# Patient Record
Sex: FEMALE | Race: WHITE | Employment: UNEMPLOYED | URBAN - METROPOLITAN AREA
[De-identification: names, ages, dates, MRNs, and addresses within clinical notes are randomized per-mention and may not be internally consistent; named-entity substitution may affect disease eponyms.]

---

## 2018-11-10 ENCOUNTER — HOSPITAL ENCOUNTER (EMERGENCY)
Age: 33
Discharge: HOME | End: 2018-11-10
Attending: EMERGENCY MEDICINE

## 2018-11-10 VITALS
TEMPERATURE: 98.2 F | RESPIRATION RATE: 16 BRPM | SYSTOLIC BLOOD PRESSURE: 123 MMHG | HEART RATE: 75 BPM | DIASTOLIC BLOOD PRESSURE: 70 MMHG | WEIGHT: 120 LBS | HEIGHT: 63 IN | BODY MASS INDEX: 21.26 KG/M2 | OXYGEN SATURATION: 99 %

## 2018-11-10 DIAGNOSIS — K02.9 PAIN DUE TO DENTAL CARIES: Primary | ICD-10-CM

## 2018-11-10 PROCEDURE — 99282 EMERGENCY DEPT VISIT SF MDM: CPT

## 2018-11-10 RX ORDER — NAPROXEN 500 MG/1
500 TABLET ORAL 2 TIMES DAILY WITH MEALS
Qty: 20 TABLET | Refills: 0 | Status: SHIPPED | OUTPATIENT
Start: 2018-11-10 | End: 2019-05-24 | Stop reason: ALTCHOICE

## 2018-11-10 RX ORDER — PENICILLIN V POTASSIUM 500 MG/1
500 TABLET ORAL 4 TIMES DAILY
Qty: 40 TABLET | Refills: 0 | Status: SHIPPED | OUTPATIENT
Start: 2018-11-10 | End: 2018-11-20

## 2018-11-10 ASSESSMENT — PAIN DESCRIPTION - PAIN TYPE: TYPE: ACUTE PAIN

## 2018-11-10 ASSESSMENT — PAIN SCALES - GENERAL: PAINLEVEL_OUTOF10: 6

## 2018-11-10 ASSESSMENT — PAIN DESCRIPTION - LOCATION: LOCATION: TEETH

## 2019-02-27 ENCOUNTER — HOSPITAL ENCOUNTER (EMERGENCY)
Age: 34
Discharge: HOME | End: 2019-02-27
Attending: EMERGENCY MEDICINE

## 2019-02-27 VITALS
DIASTOLIC BLOOD PRESSURE: 85 MMHG | TEMPERATURE: 97.8 F | BODY MASS INDEX: 21.26 KG/M2 | WEIGHT: 120 LBS | OXYGEN SATURATION: 100 % | RESPIRATION RATE: 18 BRPM | HEART RATE: 85 BPM | SYSTOLIC BLOOD PRESSURE: 121 MMHG

## 2019-02-27 DIAGNOSIS — K04.7 DENTAL INFECTION: ICD-10-CM

## 2019-02-27 DIAGNOSIS — K02.9 DENTAL CARIES: Primary | ICD-10-CM

## 2019-02-27 PROCEDURE — 6370000000 HC RX 637 (ALT 250 FOR IP): Performed by: EMERGENCY MEDICINE

## 2019-02-27 PROCEDURE — 99282 EMERGENCY DEPT VISIT SF MDM: CPT

## 2019-02-27 RX ORDER — DICLOFENAC SODIUM 75 MG/1
75 TABLET, DELAYED RELEASE ORAL 2 TIMES DAILY
Qty: 14 TABLET | Refills: 0 | Status: SHIPPED | OUTPATIENT
Start: 2019-02-27 | End: 2019-05-24 | Stop reason: ALTCHOICE

## 2019-02-27 RX ORDER — PENICILLIN V POTASSIUM 250 MG/1
500 TABLET ORAL ONCE
Status: COMPLETED | OUTPATIENT
Start: 2019-02-27 | End: 2019-02-27

## 2019-02-27 RX ORDER — ACETAMINOPHEN 500 MG
500 TABLET ORAL EVERY 6 HOURS PRN
COMMUNITY
End: 2019-05-24 | Stop reason: ALTCHOICE

## 2019-02-27 RX ORDER — IBUPROFEN 800 MG/1
800 TABLET ORAL EVERY 6 HOURS PRN
COMMUNITY
End: 2019-05-24 | Stop reason: ALTCHOICE

## 2019-02-27 RX ORDER — PENICILLIN V POTASSIUM 500 MG/1
500 TABLET ORAL 4 TIMES DAILY
Qty: 28 TABLET | Refills: 0 | Status: SHIPPED | OUTPATIENT
Start: 2019-02-27 | End: 2019-03-06

## 2019-02-27 RX ADMIN — LIDOCAINE HYDROCHLORIDE 15 ML: 20 SOLUTION ORAL; TOPICAL at 20:19

## 2019-02-27 RX ADMIN — PENICILLIN V POTASIUM 500 MG: 250 TABLET ORAL at 20:19

## 2019-02-27 ASSESSMENT — PAIN DESCRIPTION - PAIN TYPE: TYPE: ACUTE PAIN

## 2019-02-27 ASSESSMENT — PAIN SCALES - GENERAL
PAINLEVEL_OUTOF10: 10
PAINLEVEL_OUTOF10: 9

## 2019-02-27 ASSESSMENT — PAIN - FUNCTIONAL ASSESSMENT: PAIN_FUNCTIONAL_ASSESSMENT: 0-10

## 2019-03-21 ENCOUNTER — HOSPITAL ENCOUNTER (EMERGENCY)
Age: 34
Discharge: HOME | End: 2019-03-21
Payer: MEDICAID

## 2019-03-21 VITALS
WEIGHT: 110 LBS | DIASTOLIC BLOOD PRESSURE: 89 MMHG | OXYGEN SATURATION: 100 % | HEART RATE: 71 BPM | HEIGHT: 62 IN | TEMPERATURE: 98.2 F | RESPIRATION RATE: 14 BRPM | BODY MASS INDEX: 20.24 KG/M2 | SYSTOLIC BLOOD PRESSURE: 117 MMHG

## 2019-03-21 DIAGNOSIS — K04.7 DENTAL INFECTION: Primary | ICD-10-CM

## 2019-03-21 DIAGNOSIS — K08.89 PAIN, DENTAL: ICD-10-CM

## 2019-03-21 PROCEDURE — 99282 EMERGENCY DEPT VISIT SF MDM: CPT

## 2019-03-21 RX ORDER — PENICILLIN V POTASSIUM 250 MG/1
500 TABLET ORAL ONCE
Status: DISCONTINUED | OUTPATIENT
Start: 2019-03-21 | End: 2019-03-21 | Stop reason: HOSPADM

## 2019-03-21 RX ORDER — CHLORHEXIDINE GLUCONATE 0.12 MG/ML
15 RINSE ORAL 2 TIMES DAILY
Qty: 420 ML | Refills: 0 | Status: SHIPPED | OUTPATIENT
Start: 2019-03-21 | End: 2019-04-04

## 2019-03-21 RX ORDER — PENICILLIN V POTASSIUM 500 MG/1
500 TABLET ORAL 4 TIMES DAILY
Qty: 40 TABLET | Refills: 0 | Status: SHIPPED | OUTPATIENT
Start: 2019-03-21 | End: 2019-03-31

## 2019-03-21 ASSESSMENT — PAIN DESCRIPTION - LOCATION: LOCATION: MOUTH

## 2019-03-21 ASSESSMENT — PAIN SCALES - GENERAL: PAINLEVEL_OUTOF10: 7

## 2019-03-21 ASSESSMENT — PAIN DESCRIPTION - DESCRIPTORS: DESCRIPTORS: CONSTANT

## 2019-03-21 ASSESSMENT — PAIN DESCRIPTION - ORIENTATION: ORIENTATION: LEFT

## 2019-03-21 ASSESSMENT — PAIN DESCRIPTION - PAIN TYPE: TYPE: ACUTE PAIN

## 2019-05-24 ENCOUNTER — HOSPITAL ENCOUNTER (EMERGENCY)
Age: 34
Discharge: HOME | End: 2019-05-24
Payer: COMMERCIAL

## 2019-05-24 ENCOUNTER — APPOINTMENT (OUTPATIENT)
Dept: GENERAL RADIOLOGY | Age: 34
End: 2019-05-24
Payer: COMMERCIAL

## 2019-05-24 VITALS
HEIGHT: 62 IN | WEIGHT: 117 LBS | SYSTOLIC BLOOD PRESSURE: 126 MMHG | DIASTOLIC BLOOD PRESSURE: 84 MMHG | TEMPERATURE: 98.1 F | BODY MASS INDEX: 21.53 KG/M2 | HEART RATE: 98 BPM | OXYGEN SATURATION: 97 % | RESPIRATION RATE: 16 BRPM

## 2019-05-24 DIAGNOSIS — M79.672 LEFT FOOT PAIN: ICD-10-CM

## 2019-05-24 DIAGNOSIS — B07.0 PLANTAR WART OF LEFT FOOT: Primary | ICD-10-CM

## 2019-05-24 PROCEDURE — 4500000023 HC ED LEVEL 3 PROCEDURE

## 2019-05-24 PROCEDURE — 99283 EMERGENCY DEPT VISIT LOW MDM: CPT

## 2019-05-24 PROCEDURE — 73630 X-RAY EXAM OF FOOT: CPT

## 2019-05-24 ASSESSMENT — PAIN DESCRIPTION - LOCATION: LOCATION: FOOT

## 2019-05-24 ASSESSMENT — PAIN DESCRIPTION - ORIENTATION: ORIENTATION: LEFT

## 2019-05-24 ASSESSMENT — PAIN DESCRIPTION - PAIN TYPE: TYPE: CHRONIC PAIN

## 2019-05-24 ASSESSMENT — PAIN SCALES - GENERAL: PAINLEVEL_OUTOF10: 5

## 2019-05-24 NOTE — ED PROVIDER NOTES
patient's chronic foot pain. Consider further evaluation with nonemergent MRI. PROCEDURE:  Debridement of callus and plantar wart  Orelia Dakins or their surrogate had an opportunity to ask questions, and the risks, benefits, and alternatives were discussed. I prepped the bottom of her foot with alcohol swabs. Using a 15 blade, I shaved down the calloused area and cored out the wart on the bottom of the patient's left foot. The area was then cleansed again. The patient remains neurovascularly intact. There were no complications during the procedure, and the patient tolerated it well. ED COURSE/MDM:  Patient was given the following medications: none      I have evaluated this patient here in the ED. She is here reporting left foot pain that is localized to the plantar surface of her left foot where she has a plantar wart. I debrided and shaved off the dead, calloused skin using a 15 blade. The patient reports significant improvement in pain when she stands/weightbears. I recommended over-the-counter wart medication and will also provide her with a family physician referral and podiatry referral.    I estimate there is LOW risk for RETAINED FB. ABSCESS. CELLULITIS, ACUTE FRACTURE, SEPTIC ARTHRITIS, TENDON OR NEUROVASCULAR INJURY, thus I consider the discharge disposition reasonable. Orelia Dakins and I have discussed the diagnosis and risks, and we agree with discharging home to follow-up with their primary doctor or the referral orthopedist. We also discussed returning to the Emergency Department immediately if new or worsening symptoms occur. We have discussed the symptoms which are most concerning (e.g., changing or worsening pain, numbness, weakness) that necessitate immediate return. CLINICAL IMPRESSION:  1. Plantar wart of left foot    2. Left foot pain        Blood pressure 126/84, pulse 98, temperature 98.1 °F (36.7 °C), temperature source Oral, resp.  rate 16, height 5' 2\" (1.575 m), weight 117 lb (53.1 kg), SpO2 97 %, not currently breastfeeding. PATIENT REFERRED TO:  [facility]  [address], [address] [address]    Schedule an appointment as soon as possible for a visit       Elsi Marcano 93  Patient was discharged to home in good condition.           Terry Pozoma  05/24/19 8711

## 2019-05-24 NOTE — ED NOTES
Pt instructed to follow up with Podiatrist. Assessed per Ramesh MAYBERRY.      Jannie Nieto, FAITH  52/18/73 3414

## 2021-01-07 ENCOUNTER — HOSPITAL ENCOUNTER (EMERGENCY)
Age: 36
Discharge: HOME | End: 2021-01-07
Attending: EMERGENCY MEDICINE
Payer: COMMERCIAL

## 2021-01-07 VITALS
OXYGEN SATURATION: 100 % | BODY MASS INDEX: 25.21 KG/M2 | RESPIRATION RATE: 15 BRPM | SYSTOLIC BLOOD PRESSURE: 138 MMHG | DIASTOLIC BLOOD PRESSURE: 80 MMHG | HEIGHT: 62 IN | HEART RATE: 70 BPM | TEMPERATURE: 98 F | WEIGHT: 137 LBS

## 2021-01-07 DIAGNOSIS — L03.211 FACIAL CELLULITIS: ICD-10-CM

## 2021-01-07 DIAGNOSIS — K04.7 DENTAL INFECTION: Primary | ICD-10-CM

## 2021-01-07 PROCEDURE — 6370000000 HC RX 637 (ALT 250 FOR IP): Performed by: EMERGENCY MEDICINE

## 2021-01-07 PROCEDURE — 6360000002 HC RX W HCPCS: Performed by: EMERGENCY MEDICINE

## 2021-01-07 PROCEDURE — 99283 EMERGENCY DEPT VISIT LOW MDM: CPT

## 2021-01-07 PROCEDURE — 96372 THER/PROPH/DIAG INJ SC/IM: CPT

## 2021-01-07 RX ORDER — KETOROLAC TROMETHAMINE 30 MG/ML
15 INJECTION, SOLUTION INTRAMUSCULAR; INTRAVENOUS ONCE
Status: COMPLETED | OUTPATIENT
Start: 2021-01-07 | End: 2021-01-07

## 2021-01-07 RX ORDER — CLINDAMYCIN HYDROCHLORIDE 150 MG/1
150 CAPSULE ORAL ONCE
Status: COMPLETED | OUTPATIENT
Start: 2021-01-07 | End: 2021-01-07

## 2021-01-07 RX ORDER — IBUPROFEN 400 MG/1
400 TABLET ORAL EVERY 6 HOURS PRN
Qty: 20 TABLET | Refills: 0 | Status: SHIPPED | OUTPATIENT
Start: 2021-01-07

## 2021-01-07 RX ORDER — FLUOXETINE HYDROCHLORIDE 20 MG/1
20 CAPSULE ORAL DAILY
COMMUNITY

## 2021-01-07 RX ORDER — CHLORHEXIDINE GLUCONATE 0.12 MG/ML
15 RINSE ORAL 2 TIMES DAILY
Qty: 420 ML | Refills: 0 | Status: SHIPPED | OUTPATIENT
Start: 2021-01-07 | End: 2021-01-21

## 2021-01-07 RX ORDER — CLINDAMYCIN HYDROCHLORIDE 300 MG/1
300 CAPSULE ORAL 2 TIMES DAILY
Qty: 14 CAPSULE | Refills: 0 | Status: SHIPPED | OUTPATIENT
Start: 2021-01-07 | End: 2021-01-14

## 2021-01-07 RX ADMIN — CLINDAMYCIN HYDROCHLORIDE 150 MG: 150 CAPSULE ORAL at 06:17

## 2021-01-07 RX ADMIN — KETOROLAC TROMETHAMINE 15 MG: 30 INJECTION, SOLUTION INTRAMUSCULAR at 06:17

## 2021-01-07 ASSESSMENT — ENCOUNTER SYMPTOMS
SHORTNESS OF BREATH: 0
ABDOMINAL PAIN: 0
FACIAL SWELLING: 1
VOMITING: 0
TROUBLE SWALLOWING: 0
PHOTOPHOBIA: 0
COUGH: 0
COLOR CHANGE: 1

## 2021-01-07 ASSESSMENT — PAIN SCALES - GENERAL: PAINLEVEL_OUTOF10: 8

## 2021-01-07 NOTE — ED PROVIDER NOTES
[facility]  ED  EMERGENCY DEPARTMENTENCOUNTER      Pt Name: Gilmer Brown  MRN: [id number]  [place] 1985  Date ofevaluation: 1/7/2021  Provider: Deepika Sauceda MD    CHIEF COMPLAINT       Chief Complaint   Patient presents with    Dental Pain     started with toothache yesterday; now having swelling on the left side of her face         HISTORY OF PRESENT ILLNESS   (Location/Symptom, Timing/Onset,Context/Setting, Quality, Duration, Modifying Factors, Severity)  Note limiting factors. Gilmer Brown is a 38 y.o. female  who  has a past medical history of Anxiety, Bipolar 1 disorder ([address] Depression, DVT (deep vein thrombosis) in pregnancy ([facility] [address] Herpes, and STD (sexually transmitted disease). who presents to the emergency department for evaluation of dental pain and facial swelling. Patient reports to developing dental pain the previous day. She states when she woke up this morning she noticed swelling to the left side of her face under her eye. She denies fevers nausea vomiting cultures breathing or swallowing. She states does have a history of dental infections. She denies pain with eye movements or changes in vision. She has not taken medications for her symptoms. She does not currently see a dentist.     HPI    NursingNotes were reviewed. REVIEW OF SYSTEMS    (2-9 systems for level 4, 10 or more for level 5)     Review of Systems   Constitutional: Negative for activity change, fatigue and fever. HENT: Positive for dental problem and facial swelling. Negative for congestion, mouth sores and trouble swallowing. Eyes: Negative for photophobia and visual disturbance. Respiratory: Negative for cough and shortness of breath. Cardiovascular: Negative for chest pain and palpitations. Gastrointestinal: Negative for abdominal pain and vomiting. Genitourinary: Negative for difficulty urinating and frequency. Musculoskeletal: Negative for gait problem and neck pain. Skin: Positive for color change. Negative for rash. Neurological: Negative for dizziness, light-headedness and headaches. Psychiatric/Behavioral: Negative for confusion. The patient is not nervous/anxious. All other systems reviewed and are negative. Except as noted above the remainder of the review of systems was reviewed and negative.        PAST MEDICAL HISTORY     Past Medical History:   Diagnosis Date    Anxiety     no meds at present    [facility] 1 disorder ([facility] [address]     Depression     [facility]     Herpes     genital    STD (sexually transmitted disease)     herpes         SURGICALHISTORY       Past Surgical History:   Procedure Laterality Date    APPENDECTOMY       SECTION      COLPOSCOPY      2013    DILATION AND CURETTAGE OF UTERUS      2006    TUBAL LIGATION           CURRENT MEDICATIONS       Previous Medications    FLUOXETINE (PROZAC) 20 MG CAPSULE    Take 20 mg by mouth daily            Morphine    FAMILY HISTORY       Family History   Problem Relation Age of Onset    Heart Attack Father     Heart Failure Maternal Grandmother     Cancer Maternal Grandfather         lung    Heart Attack Paternal Grandmother     Cancer Paternal Grandfather         lung          SOCIAL HISTORY       Social History     Socioeconomic History    Marital status: Single     Spouse name: Not on file    Number of children: 6    Years of education: 8    Highest education level: Not on file   Occupational History    Occupation: homemaker   Social Needs    Financial resource strain: Not on file    Food insecurity     Worry: Not on file     Inability: Not on file   [employer] needs     Medical: Not on file     Non-medical: Not on file   Tobacco Use    Smoking status: Current Every Day Smoker     Packs/day: 1.00     Years: 16.00     Pack years: 16.00     Types: Cigarettes    Smokeless tobacco: Never Used   Substance and Sexual Activity  Alcohol use: No    Drug use: No    Sexual activity: Yes     Partners: Male   Lifestyle    Physical activity     Days per week: Not on file     Minutes per session: Not on file    Stress: Not on file   Relationships    Social connections     Talks on phone: Not on file     Gets together: Not on file     Attends Muslim service: Not on file     Active member of club or organization: Not on file     Attends meetings of clubs or organizations: Not on file     Relationship status: Not on file    Intimate partner violence     Fear of current or ex partner: Not on file     Emotionally abused: Not on file     Physically abused: Not on file     Forced sexual activity: Not on file   Other Topics Concern    Not on file   Social History Narrative    Second oldest child lives with his father and she sees him every weekend. SCREENINGS             PHYSICAL EXAM    (up to 7 for level 4, 8 or more for level 5)     ED Triage Vitals [01/07/21 0531]   BP Temp Temp src Pulse Resp SpO2 Height Weight   138/80 98 °F (36.7 °C) -- 70 15 100 % 5' 2\" (1.575 m) 137 lb (62.1 kg)       Physical Exam  Vitals signs and nursing note reviewed. Constitutional:       Appearance: She is well-developed. HENT:      Head: Normocephalic and atraumatic. Comments: Left-sided suborbital swelling and tenderness. Mouth/Throat:      Mouth: No oral lesions. Dentition: Abnormal dentition. Dental tenderness and dental caries present. No dental abscesses or gum lesions. Tongue: No lesions. Pharynx: Oropharynx is clear. Uvula midline. No pharyngeal swelling or oropharyngeal exudate. Eyes:      Conjunctiva/sclera: Conjunctivae normal.      Pupils: Pupils are equal, round, and reactive to light. Neck:      Musculoskeletal: Normal range of motion. Trachea: No tracheal deviation. Cardiovascular:      Rate and Rhythm: Normal rate and regular rhythm. Heart sounds: Normal heart sounds.    Pulmonary: -  Work-up included:  See above  -  ED treatment included: See above  -  Results discussed with patient. Patient presents the ED for evaluation of dental pain and facial swelling. She has very poor dentition with multiple eroding teeth. No obvious signs of abscess on inspection of her gums. There is soft tissue swelling but bedside ultrasound was used to evaluate the patient's cheek which did not show any discrete fluid collections. Vital signs are within normal limits and patient nontoxic-appearing. She is managing her secretions oropharynx is clear. Patient feels improved on reevaluation. Symptomatic treatment with expectant management discussed with the patient and the amenable to treatment plan and outpatient follow-up. Strict return precautions were discussed with the patient. They demonstrated understanding of when to return to the emergency department for new or worsening symptoms. .  The patient is agreeable with plan of care and disposition. REASSESSMENT          CRITICAL CARE TIME   Total Critical Care time was 0 minutes, excluding separatelyreportable procedures. There was a high probability ofclinically significant/life threatening deterioration in the patient's condition which required my urgent intervention. CONSULTS:  None    PROCEDURES:  Unless otherwise noted below, none     Procedures    FINAL IMPRESSION      1. Dental infection    2.  Facial cellulitis          DISPOSITION/PLAN   DISPOSITION Discharge - Pending Orders Complete 01/07/2021 05:43:37 AM      PATIENT REFERREDTO:  [facility]) Pre-Services  [phone]          DISCHARGEMEDICATIONS:  New Prescriptions    CHLORHEXIDINE (PERIDEX) 0.12 % SOLUTION    Take 15 mLs by mouth 2 times daily for 14 days    CLINDAMYCIN (CLEOCIN) 300 MG CAPSULE    Take 1 capsule by mouth 2 times daily for 7 days    IBUPROFEN (ADVIL;MOTRIN) 400 MG TABLET    Take 1 tablet by mouth every 6 hours as needed for Pain (Please note that portions of this note were completed with a voice recognition program.  Efforts were made to edit the dictations but occasionally words are mis-transcribed.)    Omid Boswell MD (electronically signed)  Attending Emergency Physician          Omid Boswell MD  01/07/21 [id number]

## 2021-02-07 ENCOUNTER — HOSPITAL ENCOUNTER (EMERGENCY)
Age: 36
Discharge: HOME | End: 2021-02-07
Attending: EMERGENCY MEDICINE
Payer: COMMERCIAL

## 2021-02-07 VITALS
WEIGHT: 137 LBS | HEIGHT: 62 IN | SYSTOLIC BLOOD PRESSURE: 115 MMHG | OXYGEN SATURATION: 99 % | BODY MASS INDEX: 25.21 KG/M2 | RESPIRATION RATE: 18 BRPM | HEART RATE: 109 BPM | DIASTOLIC BLOOD PRESSURE: 82 MMHG | TEMPERATURE: 98.8 F

## 2021-02-07 DIAGNOSIS — K04.7 DENTAL ABSCESS: Primary | ICD-10-CM

## 2021-02-07 PROCEDURE — 99283 EMERGENCY DEPT VISIT LOW MDM: CPT

## 2021-02-07 PROCEDURE — 6370000000 HC RX 637 (ALT 250 FOR IP): Performed by: EMERGENCY MEDICINE

## 2021-02-07 RX ORDER — ACETAMINOPHEN 325 MG/1
650 TABLET ORAL ONCE
Status: COMPLETED | OUTPATIENT
Start: 2021-02-07 | End: 2021-02-07

## 2021-02-07 RX ORDER — PENICILLIN V POTASSIUM 250 MG/1
500 TABLET ORAL ONCE
Status: COMPLETED | OUTPATIENT
Start: 2021-02-07 | End: 2021-02-07

## 2021-02-07 RX ORDER — PENICILLIN V POTASSIUM 500 MG/1
500 TABLET ORAL 4 TIMES DAILY
Qty: 40 TABLET | Refills: 0 | Status: SHIPPED | OUTPATIENT
Start: 2021-02-07 | End: 2021-02-17

## 2021-02-07 RX ORDER — IBUPROFEN 600 MG/1
600 TABLET ORAL ONCE
Status: COMPLETED | OUTPATIENT
Start: 2021-02-07 | End: 2021-02-07

## 2021-02-07 RX ADMIN — IBUPROFEN 600 MG: 600 TABLET ORAL at 13:48

## 2021-02-07 RX ADMIN — ACETAMINOPHEN 650 MG: 325 TABLET ORAL at 13:48

## 2021-02-07 RX ADMIN — PENICILLIN V POTASSIUM 500 MG: 250 TABLET, FILM COATED ORAL at 13:48

## 2021-02-07 NOTE — ED PROVIDER NOTES
for level 5)     ED Triage Vitals   BP Temp Temp Source Pulse Resp SpO2 Height Weight   02/07/21 1315 02/07/21 1315 02/07/21 1315 02/07/21 1315 02/07/21 1313 02/07/21 1315 02/07/21 1313 02/07/21 1313   115/82 98.8 °F (37.1 °C) Oral 109 18 99 % 5' 2\" (1.575 m) 137 lb (62.1 kg)       Physical Exam      General appearance:  Cooperative. No acute distress. Skin:  Warm. Dry. Ears, nose, mouth and throat:  Oral mucosa moist, mild soft tissue swelling to the right upper face just inferior and lateral to the maxillary sinus. No trismus. Patient has markedly poor dentition with the majority of her teeth decayed down to the gumline. There is soft tissue swelling edema and erythema to the right upper gumline adjacent to tooth #3 and 4 with purulent discharge present at the site. Tongue and uvular midline. Floor of mouth soft. Perfusion:  intact  Respiratory: Respirations nonlabored. Neurological:  Alert. Moves all extremities spontaneously  Musculoskeletal:   Normal ROM, no deformities          Psychiatric:  Normal mood      DIAGNOSTIC RESULTS       Labs Reviewed - No data to display    Interpretation per the Radiologist below, if obtained/available at the time of this note:    No orders to display       All other labs/imaging were within normal range or not returned as of this dictation. EMERGENCY DEPARTMENT COURSE and DIFFERENTIAL DIAGNOSIS/MDM:   Vitals:    Vitals:    02/07/21 1313 02/07/21 1315   BP:  115/82   Pulse:  109   Resp: 18 18   Temp:  98.8 °F (37.1 °C)   TempSrc:  Oral   SpO2:  99%   Weight: 137 lb (62.1 kg)    Height: 5' 2\" (1.575 m)        Patient with an obvious dental abscess that is spontaneously draining at this time. Otherwise hemodynamically stable. Tolerating oral intake. Did not require incision and drainage given her spontaneous drainage.   Given penicillin here will be discharged home with the same as well as instructions to follow-up with a dentist of her

## 2022-01-12 ENCOUNTER — HOSPITAL ENCOUNTER (EMERGENCY)
Age: 37
Discharge: HOME | End: 2022-01-12
Attending: EMERGENCY MEDICINE
Payer: COMMERCIAL

## 2022-01-12 VITALS
HEART RATE: 94 BPM | DIASTOLIC BLOOD PRESSURE: 79 MMHG | SYSTOLIC BLOOD PRESSURE: 130 MMHG | BODY MASS INDEX: 25.21 KG/M2 | OXYGEN SATURATION: 99 % | WEIGHT: 137 LBS | RESPIRATION RATE: 16 BRPM | TEMPERATURE: 98.6 F | HEIGHT: 62 IN

## 2022-01-12 DIAGNOSIS — K04.7 DENTAL INFECTION: Primary | ICD-10-CM

## 2022-01-12 PROCEDURE — 6370000000 HC RX 637 (ALT 250 FOR IP): Performed by: EMERGENCY MEDICINE

## 2022-01-12 PROCEDURE — 99283 EMERGENCY DEPT VISIT LOW MDM: CPT

## 2022-01-12 RX ORDER — IBUPROFEN 600 MG/1
600 TABLET ORAL ONCE
Status: COMPLETED | OUTPATIENT
Start: 2022-01-12 | End: 2022-01-12

## 2022-01-12 RX ORDER — HYDROCODONE BITARTRATE AND ACETAMINOPHEN 5; 325 MG/1; MG/1
1 TABLET ORAL ONCE
Status: COMPLETED | OUTPATIENT
Start: 2022-01-12 | End: 2022-01-12

## 2022-01-12 RX ORDER — PENICILLIN V POTASSIUM 500 MG/1
500 TABLET ORAL 4 TIMES DAILY
Qty: 40 TABLET | Refills: 0 | Status: SHIPPED | OUTPATIENT
Start: 2022-01-12 | End: 2022-01-22

## 2022-01-12 RX ORDER — IBUPROFEN 600 MG/1
600 TABLET ORAL 3 TIMES DAILY PRN
Qty: 30 TABLET | Refills: 0 | Status: SHIPPED | OUTPATIENT
Start: 2022-01-12

## 2022-01-12 RX ORDER — PENICILLIN V POTASSIUM 250 MG/1
500 TABLET ORAL ONCE
Status: COMPLETED | OUTPATIENT
Start: 2022-01-12 | End: 2022-01-12

## 2022-01-12 RX ADMIN — HYDROCODONE BITARTRATE AND ACETAMINOPHEN 1 TABLET: 5; 325 TABLET ORAL at 06:33

## 2022-01-12 RX ADMIN — PENICILLIN V POTASSIUM 500 MG: 250 TABLET, FILM COATED ORAL at 06:33

## 2022-01-12 RX ADMIN — IBUPROFEN 600 MG: 600 TABLET ORAL at 06:33

## 2022-01-12 ASSESSMENT — PAIN DESCRIPTION - LOCATION: LOCATION: TEETH

## 2022-01-12 ASSESSMENT — PAIN SCALES - GENERAL: PAINLEVEL_OUTOF10: 5

## 2022-01-12 ASSESSMENT — PAIN DESCRIPTION - PAIN TYPE: TYPE: ACUTE PAIN

## 2022-01-12 NOTE — ED PROVIDER NOTES
[facility]  ED  EMERGENCY DEPARTMENT ENCOUNTER      Pt Name: Pratik Virk  MRN: [id number]  [place] 1985  Date of evaluation: 2022  Provider: Jessica Gomez MD    CHIEF COMPLAINT       Chief Complaint   Patient presents with    Dental Pain     +facial swelling, front tooth woke up with pain at 4am         HISTORY OF PRESENT ILLNESS   (Location/Symptom, Timing/Onset, Context/Setting, Quality, Duration, Modifying Factors, Severity)  Note limiting factors. Pratik Virk is a 43 y.o. female with past medical history of no related illness here today for dental pain. Patient states she has a longstanding history of poor dentition. She notes over the course of the last 24 hours she has had increased throbbing aching moderate to severe pain in the left front of her upper jaw with associated soft tissue swelling. No fevers or chills. Pain is worse with \"air touching my teeth\". She states she has been unable to eat. She has not been able to see a dentist though knows she needs significant dental work. No obvious alleviating factors. No fevers. No trouble swallowing or speaking. HPI    Nursing Notes were reviewed. REVIEW OF SYSTEMS    (2-9 systems for level 4, 10 or more for level 5)     Review of Systems    Please see HPI for pertinent positive and negative review of system findings. A full 10 system ROS was performed and otherwise negative.         PAST MEDICAL HISTORY     Past Medical History:   Diagnosis Date    Anxiety     no meds at present    Bipolar 1 disorder ([facility] [address]     Depression     DVT (deep vein thrombosis) in pregnancy     Herpes 2013    genital    STD (sexually transmitted disease) 2013    herpes         SURGICAL HISTORY       Past Surgical History:   Procedure Laterality Date    APPENDECTOMY       SECTION      COLPOSCOPY      2013    DILATION AND CURETTAGE OF UTERUS      2006    TUBAL LIGATION           CURRENT MEDICATIONS Previous Medications    FLUOXETINE (PROZAC) 20 MG CAPSULE    Take 20 mg by mouth daily    IBUPROFEN (ADVIL;MOTRIN) 400 MG TABLET    Take 1 tablet by mouth every 6 hours as needed for Pain       ALLERGIES     Morphine    FAMILY HISTORY       Family History   Problem Relation Age of Onset    Heart Attack Father     Heart Failure Maternal Grandmother     Cancer Maternal Grandfather         lung    Heart Attack Paternal Grandmother     Cancer Paternal Grandfather         lung          SOCIAL HISTORY       Social History     Socioeconomic History    Marital status: Single     Spouse name: None    Number of children: 10    Years of education: 8    Highest education level: None   Occupational History    Occupation: homemaker   Tobacco Use    Smoking status: Current Every Day Smoker     Packs/day: 1.00     Years: 16.00     Pack years: 16.00     Types: Cigarettes    Smokeless tobacco: Never Used   Substance and Sexual Activity    Alcohol use: No    Drug use: No    Sexual activity: Yes     Partners: Male   Other Topics Concern    None   Social History Narrative    Second oldest child lives with his father and she sees him every weekend. Social Determinants of Health     Financial Resource Strain:     Difficulty of Paying Living Expenses: Not on file   Food Insecurity:     Worried About Running Out of Food in the Last Year: Not on file    Humera of Food in the Last Year: Not on file   Transportation Needs:     Lack of Transportation (Medical): Not on file    Lack of Transportation (Non-Medical):  Not on file   Physical Activity:     Days of Exercise per Week: Not on file    Minutes of Exercise per Session: Not on file   Stress:     Feeling of Stress : Not on file   Social Connections:     Frequency of Communication with Friends and Family: Not on file    Frequency of Social Gatherings with Friends and Family: Not on file    Attends Holiness Services: Not on file   [employer] of Clubs or Organizations: Not on file    Attends Club or Organization Meetings: Not on file    Marital Status: Not on file   Intimate Partner Violence:     Fear of Current or Ex-Partner: Not on file    Emotionally Abused: Not on file    Physically Abused: Not on file    Sexually Abused: Not on file   Housing Stability:     Unable to Pay for Housing in the Last Year: Not on file    Number of [place] in the Last Year: Not on file    Unstable Housing in the Last Year: Not on file       SCREENINGS               PHYSICAL EXAM    (up to 7 for level 4, 8 or more for level 5)     ED Triage Vitals [01/12/22 0534]   BP Temp Temp Source Pulse Resp SpO2 Height Weight   130/79 98.6 °F (37 °C) Temporal 94 14 99 % 5' 2\" (1.575 m) 137 lb (62.1 kg)       Physical Exam      General appearance:  Cooperative. No acute distress. Skin:  Warm. Dry. Ears, nose, mouth and throat:  Oral mucosa moist, mild soft tissue swelling and fullness in the left nasolabial fold without overlying erythema. Tongue and uvular midline. Floor of mouth soft. Very poor dentition with heavily diseased teeth throughout. Mild fullness in the upper gums of the left anterior jaw particularly above tooth #10 and 11. No gross fluctuance. No discharge or drainage. Floor mouth soft. No trismus. Perfusion:  intact  Respiratory: Respirations nonlabored. Neurological:  Alert. Moves all extremities spontaneously  Musculoskeletal:   Normal ROM, no deformities          Psychiatric:  Normal mood      DIAGNOSTIC RESULTS       Labs Reviewed - No data to display    Interpretation per the Radiologist below, if obtained/available at the time of this note:    No orders to display       All other labs/imaging were within normal range or not returned as of this dictation.     EMERGENCY DEPARTMENT COURSE and DIFFERENTIAL DIAGNOSIS/MDM:   Vitals:    Vitals:    01/12/22 0534   BP: 130/79   Pulse: 94   Resp: 14   Temp: 98.6 °F (37 °C)   TempSrc: Temporal   SpO2: 99% Weight: 137 lb (62.1 kg)   Height: 5' 2\" (1.575 m)       Patient presents with a dental infection. She does have some mild soft tissue facial swelling and swelling in the left upper jaw but no gross fluctuance or drainable mass. Patient was given pain medication, 1 dose of narcotics here and was started on penicillin. Will be discharged home with penicillin and Motrin and given outpatient dental follow-up. Encouraged to return for worsening    MDM    CONSULTS     None    Critical Care:   None    REASSESSMENT          PROCEDURE     Unless otherwise noted below, none     Procedures      FINAL IMPRESSION      1. Dental infection            DISPOSITION/PLAN   DISPOSITION Decision To Discharge 01/12/2022 06:30:15 AM        PATIENT REFERRED TO:  Dentistry    Schedule an appointment as soon as possible for a visit   Please follow up with a dentist of your [address] [address]  [phone]  Schedule an appointment as soon as possible for a visit today        DISCHARGE MEDICATIONS:  New Prescriptions    IBUPROFEN (ADVIL;MOTRIN) 600 MG TABLET    Take 1 tablet by mouth 3 times daily as needed for Pain    PENICILLIN V POTASSIUM (VEETID) 500 MG TABLET    Take 1 tablet by mouth 4 times daily for 10 days     Controlled Substances Monitoring:     No flowsheet data found.     (Please note that portions of this note were completed with a voice recognition program.  Efforts were made to edit the dictations but occasionally words are mis-transcribed.)    Mike Dent MD (electronically signed)  Attending Emergency Physician            Marty Jimenez MD  01/12/22 5546

## 2023-12-09 ENCOUNTER — HOSPITAL ENCOUNTER (EMERGENCY)
Age: 38
Discharge: HOME | End: 2023-12-09

## 2023-12-09 VITALS
HEIGHT: 62 IN | HEART RATE: 81 BPM | TEMPERATURE: 98.3 F | WEIGHT: 110 LBS | BODY MASS INDEX: 20.24 KG/M2 | SYSTOLIC BLOOD PRESSURE: 106 MMHG | OXYGEN SATURATION: 100 % | DIASTOLIC BLOOD PRESSURE: 72 MMHG | RESPIRATION RATE: 16 BRPM

## 2023-12-09 DIAGNOSIS — J01.00 ACUTE NON-RECURRENT MAXILLARY SINUSITIS: Primary | ICD-10-CM

## 2023-12-09 PROCEDURE — 99283 EMERGENCY DEPT VISIT LOW MDM: CPT

## 2023-12-09 RX ORDER — AMOXICILLIN AND CLAVULANATE POTASSIUM 875; 125 MG/1; MG/1
1 TABLET, FILM COATED ORAL 2 TIMES DAILY
Qty: 20 TABLET | Refills: 0 | Status: SHIPPED | OUTPATIENT
Start: 2023-12-09 | End: 2023-12-19

## 2023-12-09 ASSESSMENT — PAIN - FUNCTIONAL ASSESSMENT: PAIN_FUNCTIONAL_ASSESSMENT: 0-10

## 2023-12-09 ASSESSMENT — PAIN SCALES - GENERAL: PAINLEVEL_OUTOF10: 5

## 2023-12-09 NOTE — DISCHARGE INSTRUCTIONS
-Take 400mg Ibuprofen (Motrin) and 500mg Acetaminophen (Tylenol) every 4 hours for pain.   -Antibiotics for your sinus infection.   -Follow up with ENT next week. -Come back if you feel worse.

## 2024-03-11 ENCOUNTER — APPOINTMENT (OUTPATIENT)
Dept: CT IMAGING | Age: 39
DRG: 271 | End: 2024-03-11
Attending: EMERGENCY MEDICINE
Payer: MEDICAID

## 2024-03-11 ENCOUNTER — APPOINTMENT (OUTPATIENT)
Dept: VASCULAR LAB | Age: 39
DRG: 271 | End: 2024-03-11
Payer: MEDICAID

## 2024-03-11 ENCOUNTER — HOSPITAL ENCOUNTER (INPATIENT)
Age: 39
LOS: 2 days | Discharge: HOME | DRG: 271 | End: 2024-03-13
Attending: EMERGENCY MEDICINE | Admitting: HOSPITALIST
Payer: MEDICAID

## 2024-03-11 DIAGNOSIS — I82.401 ACUTE DEEP VEIN THROMBOSIS (DVT) OF RIGHT LOWER EXTREMITY, UNSPECIFIED VEIN (HCC): ICD-10-CM

## 2024-03-11 DIAGNOSIS — I82.411 ACUTE DEEP VEIN THROMBOSIS (DVT) OF FEMORAL VEIN OF RIGHT LOWER EXTREMITY (HCC): ICD-10-CM

## 2024-03-11 DIAGNOSIS — D68.51 FACTOR V LEIDEN (HCC): ICD-10-CM

## 2024-03-11 DIAGNOSIS — I82.421 ACUTE DEEP VEIN THROMBOSIS (DVT) OF ILIAC VEIN OF RIGHT LOWER EXTREMITY (HCC): Primary | ICD-10-CM

## 2024-03-11 LAB
ALBUMIN SERPL-MCNC: 4.1 G/DL (ref 3.4–5)
ALBUMIN/GLOB SERPL: 1.2 {RATIO} (ref 1.1–2.2)
ALP SERPL-CCNC: 211 U/L (ref 40–129)
ALT SERPL-CCNC: 40 U/L (ref 10–40)
ANION GAP SERPL CALCULATED.3IONS-SCNC: 13 MMOL/L (ref 3–16)
ANTI-XA UNFRAC HEPARIN: 0.12 IU/ML (ref 0.3–0.7)
ANTI-XA UNFRAC HEPARIN: <0.1 IU/ML (ref 0.3–0.7)
APTT BLD: 32.8 SEC (ref 22.7–35.9)
AST SERPL-CCNC: 32 U/L (ref 15–37)
BACTERIA URNS QL MICRO: ABNORMAL /HPF
BASOPHILS # BLD: 0.1 K/UL (ref 0–0.2)
BASOPHILS NFR BLD: 0.5 %
BILIRUB SERPL-MCNC: 0.7 MG/DL (ref 0–1)
BILIRUB UR QL STRIP.AUTO: ABNORMAL
BUN SERPL-MCNC: 14 MG/DL (ref 7–20)
CALCIUM SERPL-MCNC: 9.6 MG/DL (ref 8.3–10.6)
CHLORIDE SERPL-SCNC: 97 MMOL/L (ref 99–110)
CLARITY UR: ABNORMAL
CO2 SERPL-SCNC: 24 MMOL/L (ref 21–32)
COLOR UR: YELLOW
CREAT SERPL-MCNC: 0.7 MG/DL (ref 0.6–1.1)
DEPRECATED RDW RBC AUTO: 13.7 % (ref 12.4–15.4)
EKG ATRIAL RATE: 83 BPM
EKG DIAGNOSIS: NORMAL
EKG P AXIS: 80 DEGREES
EKG P-R INTERVAL: 150 MS
EKG Q-T INTERVAL: 348 MS
EKG QRS DURATION: 90 MS
EKG QTC CALCULATION (BAZETT): 408 MS
EKG R AXIS: 74 DEGREES
EKG T AXIS: 12 DEGREES
EKG VENTRICULAR RATE: 83 BPM
EOSINOPHIL # BLD: 0.3 K/UL (ref 0–0.6)
EOSINOPHIL NFR BLD: 2.2 %
EPI CELLS #/AREA URNS HPF: ABNORMAL /HPF (ref 0–5)
GFR SERPLBLD CREATININE-BSD FMLA CKD-EPI: >60 ML/MIN/{1.73_M2}
GLUCOSE SERPL-MCNC: 105 MG/DL (ref 70–99)
GLUCOSE UR STRIP.AUTO-MCNC: NEGATIVE MG/DL
HCG SERPL QL: NEGATIVE
HCT VFR BLD AUTO: 38.9 % (ref 36–48)
HGB BLD-MCNC: 13 G/DL (ref 12–16)
HGB UR QL STRIP.AUTO: ABNORMAL
INR PPP: 1.1 (ref 0.84–1.16)
KETONES UR STRIP.AUTO-MCNC: 15 MG/DL
LEUKOCYTE ESTERASE UR QL STRIP.AUTO: ABNORMAL
LIPASE SERPL-CCNC: 25 U/L (ref 13–60)
LYMPHOCYTES # BLD: 2 K/UL (ref 1–5.1)
LYMPHOCYTES NFR BLD: 15.9 %
MAGNESIUM SERPL-MCNC: 1.6 MG/DL (ref 1.8–2.4)
MCH RBC QN AUTO: 30.7 PG (ref 26–34)
MCHC RBC AUTO-ENTMCNC: 33.5 G/DL (ref 31–36)
MCV RBC AUTO: 91.4 FL (ref 80–100)
MONOCYTES # BLD: 1 K/UL (ref 0–1.3)
MONOCYTES NFR BLD: 7.6 %
NEUTROPHILS # BLD: 9.5 K/UL (ref 1.7–7.7)
NEUTROPHILS NFR BLD: 73.8 %
NITRITE UR QL STRIP.AUTO: NEGATIVE
PH UR STRIP.AUTO: 6.5 [PH] (ref 5–8)
PLATELET # BLD AUTO: 223 K/UL (ref 135–450)
PMV BLD AUTO: 8.3 FL (ref 5–10.5)
POTASSIUM SERPL-SCNC: 3.5 MMOL/L (ref 3.5–5.1)
PROT SERPL-MCNC: 7.4 G/DL (ref 6.4–8.2)
PROT UR STRIP.AUTO-MCNC: 30 MG/DL
PROTHROMBIN TIME: 14.2 SEC (ref 11.5–14.8)
RBC # BLD AUTO: 4.25 M/UL (ref 4–5.2)
RBC #/AREA URNS HPF: ABNORMAL /HPF (ref 0–4)
SODIUM SERPL-SCNC: 134 MMOL/L (ref 136–145)
SP GR UR STRIP.AUTO: 1.02 (ref 1–1.03)
UA COMPLETE W REFLEX CULTURE PNL UR: ABNORMAL
UA DIPSTICK W REFLEX MICRO PNL UR: YES
URN SPEC COLLECT METH UR: ABNORMAL
UROBILINOGEN UR STRIP-ACNC: 4 E.U./DL
WBC # BLD AUTO: 12.8 K/UL (ref 4–11)
WBC #/AREA URNS HPF: ABNORMAL /HPF (ref 0–5)

## 2024-03-11 PROCEDURE — 99285 EMERGENCY DEPT VISIT HI MDM: CPT

## 2024-03-11 PROCEDURE — 85610 PROTHROMBIN TIME: CPT

## 2024-03-11 PROCEDURE — 93971 EXTREMITY STUDY: CPT

## 2024-03-11 PROCEDURE — 6360000002 HC RX W HCPCS: Performed by: NURSE PRACTITIONER

## 2024-03-11 PROCEDURE — 6370000000 HC RX 637 (ALT 250 FOR IP): Performed by: EMERGENCY MEDICINE

## 2024-03-11 PROCEDURE — 80053 COMPREHEN METABOLIC PANEL: CPT

## 2024-03-11 PROCEDURE — 2580000003 HC RX 258: Performed by: HOSPITALIST

## 2024-03-11 PROCEDURE — 6360000002 HC RX W HCPCS: Performed by: EMERGENCY MEDICINE

## 2024-03-11 PROCEDURE — 81001 URINALYSIS AUTO W/SCOPE: CPT

## 2024-03-11 PROCEDURE — 85730 THROMBOPLASTIN TIME PARTIAL: CPT

## 2024-03-11 PROCEDURE — 85520 HEPARIN ASSAY: CPT

## 2024-03-11 PROCEDURE — 93005 ELECTROCARDIOGRAM TRACING: CPT | Performed by: EMERGENCY MEDICINE

## 2024-03-11 PROCEDURE — 74177 CT ABD & PELVIS W/CONTRAST: CPT

## 2024-03-11 PROCEDURE — 2060000000 HC ICU INTERMEDIATE R&B

## 2024-03-11 PROCEDURE — 84703 CHORIONIC GONADOTROPIN ASSAY: CPT

## 2024-03-11 PROCEDURE — 6360000004 HC RX CONTRAST MEDICATION: Performed by: EMERGENCY MEDICINE

## 2024-03-11 PROCEDURE — 6370000000 HC RX 637 (ALT 250 FOR IP): Performed by: HOSPITALIST

## 2024-03-11 PROCEDURE — 83690 ASSAY OF LIPASE: CPT

## 2024-03-11 PROCEDURE — 6370000000 HC RX 637 (ALT 250 FOR IP): Performed by: NURSE PRACTITIONER

## 2024-03-11 PROCEDURE — 6360000002 HC RX W HCPCS: Performed by: HOSPITALIST

## 2024-03-11 PROCEDURE — 83735 ASSAY OF MAGNESIUM: CPT

## 2024-03-11 PROCEDURE — 85025 COMPLETE CBC W/AUTO DIFF WBC: CPT

## 2024-03-11 PROCEDURE — 93010 ELECTROCARDIOGRAM REPORT: CPT | Performed by: INTERNAL MEDICINE

## 2024-03-11 PROCEDURE — APPSS60 APP SPLIT SHARED TIME 46-60 MINUTES: Performed by: CLINICAL NURSE SPECIALIST

## 2024-03-11 PROCEDURE — 36415 COLL VENOUS BLD VENIPUNCTURE: CPT

## 2024-03-11 RX ORDER — SODIUM CHLORIDE 9 MG/ML
INJECTION, SOLUTION INTRAVENOUS CONTINUOUS
Status: ACTIVE | OUTPATIENT
Start: 2024-03-11 | End: 2024-03-13

## 2024-03-11 RX ORDER — MAGNESIUM SULFATE IN WATER 40 MG/ML
2000 INJECTION, SOLUTION INTRAVENOUS PRN
Status: DISCONTINUED | OUTPATIENT
Start: 2024-03-11 | End: 2024-03-13 | Stop reason: HOSPADM

## 2024-03-11 RX ORDER — FENTANYL CITRATE 50 UG/ML
50 INJECTION, SOLUTION INTRAMUSCULAR; INTRAVENOUS ONCE
Status: COMPLETED | OUTPATIENT
Start: 2024-03-11 | End: 2024-03-11

## 2024-03-11 RX ORDER — HEPARIN SODIUM 1000 [USP'U]/ML
2000 INJECTION, SOLUTION INTRAVENOUS; SUBCUTANEOUS ONCE
Status: COMPLETED | OUTPATIENT
Start: 2024-03-11 | End: 2024-03-11

## 2024-03-11 RX ORDER — POTASSIUM CHLORIDE 20 MEQ/1
40 TABLET, EXTENDED RELEASE ORAL PRN
Status: DISCONTINUED | OUTPATIENT
Start: 2024-03-11 | End: 2024-03-13 | Stop reason: HOSPADM

## 2024-03-11 RX ORDER — ONDANSETRON 2 MG/ML
4 INJECTION INTRAMUSCULAR; INTRAVENOUS EVERY 6 HOURS PRN
Status: DISCONTINUED | OUTPATIENT
Start: 2024-03-11 | End: 2024-03-13 | Stop reason: HOSPADM

## 2024-03-11 RX ORDER — ACETAMINOPHEN 325 MG/1
650 TABLET ORAL EVERY 6 HOURS PRN
Status: DISCONTINUED | OUTPATIENT
Start: 2024-03-11 | End: 2024-03-13 | Stop reason: HOSPADM

## 2024-03-11 RX ORDER — ACETAMINOPHEN 650 MG/1
650 SUPPOSITORY RECTAL EVERY 6 HOURS PRN
Status: DISCONTINUED | OUTPATIENT
Start: 2024-03-11 | End: 2024-03-13 | Stop reason: HOSPADM

## 2024-03-11 RX ORDER — SODIUM CHLORIDE 9 MG/ML
INJECTION, SOLUTION INTRAVENOUS PRN
Status: DISCONTINUED | OUTPATIENT
Start: 2024-03-11 | End: 2024-03-13 | Stop reason: HOSPADM

## 2024-03-11 RX ORDER — NICOTINE 21 MG/24HR
1 PATCH, TRANSDERMAL 24 HOURS TRANSDERMAL DAILY
Status: DISCONTINUED | OUTPATIENT
Start: 2024-03-11 | End: 2024-03-13 | Stop reason: HOSPADM

## 2024-03-11 RX ORDER — HEPARIN SODIUM 1000 [USP'U]/ML
2000 INJECTION, SOLUTION INTRAVENOUS; SUBCUTANEOUS PRN
Status: DISCONTINUED | OUTPATIENT
Start: 2024-03-11 | End: 2024-03-13 | Stop reason: HOSPADM

## 2024-03-11 RX ORDER — HEPARIN SODIUM 10000 [USP'U]/100ML
1300 INJECTION, SOLUTION INTRAVENOUS CONTINUOUS
Status: DISCONTINUED | OUTPATIENT
Start: 2024-03-11 | End: 2024-03-13 | Stop reason: HOSPADM

## 2024-03-11 RX ORDER — HEPARIN SODIUM 1000 [USP'U]/ML
4000 INJECTION, SOLUTION INTRAVENOUS; SUBCUTANEOUS ONCE
Status: COMPLETED | OUTPATIENT
Start: 2024-03-11 | End: 2024-03-11

## 2024-03-11 RX ORDER — OXYCODONE HYDROCHLORIDE 5 MG/1
5 TABLET ORAL ONCE
Status: COMPLETED | OUTPATIENT
Start: 2024-03-11 | End: 2024-03-11

## 2024-03-11 RX ORDER — POLYETHYLENE GLYCOL 3350 17 G/17G
17 POWDER, FOR SOLUTION ORAL DAILY PRN
Status: DISCONTINUED | OUTPATIENT
Start: 2024-03-11 | End: 2024-03-13 | Stop reason: HOSPADM

## 2024-03-11 RX ORDER — SODIUM CHLORIDE 0.9 % (FLUSH) 0.9 %
5-40 SYRINGE (ML) INJECTION PRN
Status: DISCONTINUED | OUTPATIENT
Start: 2024-03-11 | End: 2024-03-13 | Stop reason: HOSPADM

## 2024-03-11 RX ORDER — FLUOXETINE HYDROCHLORIDE 20 MG/1
20 CAPSULE ORAL DAILY
Status: DISCONTINUED | OUTPATIENT
Start: 2024-03-12 | End: 2024-03-13 | Stop reason: HOSPADM

## 2024-03-11 RX ORDER — HEPARIN SODIUM 1000 [USP'U]/ML
4000 INJECTION, SOLUTION INTRAVENOUS; SUBCUTANEOUS PRN
Status: DISCONTINUED | OUTPATIENT
Start: 2024-03-11 | End: 2024-03-13 | Stop reason: HOSPADM

## 2024-03-11 RX ORDER — SODIUM CHLORIDE 0.9 % (FLUSH) 0.9 %
5-40 SYRINGE (ML) INJECTION EVERY 12 HOURS SCHEDULED
Status: DISCONTINUED | OUTPATIENT
Start: 2024-03-11 | End: 2024-03-13 | Stop reason: HOSPADM

## 2024-03-11 RX ORDER — ONDANSETRON 4 MG/1
4 TABLET, ORALLY DISINTEGRATING ORAL EVERY 8 HOURS PRN
Status: DISCONTINUED | OUTPATIENT
Start: 2024-03-11 | End: 2024-03-13 | Stop reason: HOSPADM

## 2024-03-11 RX ORDER — MAGNESIUM SULFATE IN WATER 40 MG/ML
2000 INJECTION, SOLUTION INTRAVENOUS ONCE
Status: COMPLETED | OUTPATIENT
Start: 2024-03-11 | End: 2024-03-11

## 2024-03-11 RX ORDER — HYDROMORPHONE HYDROCHLORIDE 1 MG/ML
0.5 INJECTION, SOLUTION INTRAMUSCULAR; INTRAVENOUS; SUBCUTANEOUS EVERY 4 HOURS PRN
Status: DISPENSED | OUTPATIENT
Start: 2024-03-11 | End: 2024-03-13

## 2024-03-11 RX ORDER — DEXTROSE AND SODIUM CHLORIDE 5; .45 G/100ML; G/100ML
INJECTION, SOLUTION INTRAVENOUS CONTINUOUS
Status: DISCONTINUED | OUTPATIENT
Start: 2024-03-11 | End: 2024-03-11

## 2024-03-11 RX ORDER — POTASSIUM CHLORIDE 7.45 MG/ML
10 INJECTION INTRAVENOUS PRN
Status: DISCONTINUED | OUTPATIENT
Start: 2024-03-11 | End: 2024-03-13 | Stop reason: HOSPADM

## 2024-03-11 RX ADMIN — HYDROMORPHONE HYDROCHLORIDE 0.5 MG: 1 INJECTION, SOLUTION INTRAMUSCULAR; INTRAVENOUS; SUBCUTANEOUS at 15:44

## 2024-03-11 RX ADMIN — ACETAMINOPHEN 650 MG: 325 TABLET ORAL at 22:56

## 2024-03-11 RX ADMIN — MAGNESIUM SULFATE IN WATER 2000 MG: 40 INJECTION, SOLUTION INTRAVENOUS at 12:53

## 2024-03-11 RX ADMIN — HYDROMORPHONE HYDROCHLORIDE 0.5 MG: 1 INJECTION, SOLUTION INTRAMUSCULAR; INTRAVENOUS; SUBCUTANEOUS at 20:01

## 2024-03-11 RX ADMIN — IOPAMIDOL 75 ML: 755 INJECTION, SOLUTION INTRAVENOUS at 11:41

## 2024-03-11 RX ADMIN — HEPARIN SODIUM 900 UNITS/HR: 10000 INJECTION, SOLUTION INTRAVENOUS at 13:12

## 2024-03-11 RX ADMIN — OXYCODONE 5 MG: 5 TABLET ORAL at 10:10

## 2024-03-11 RX ADMIN — HEPARIN SODIUM 2000 UNITS: 1000 INJECTION INTRAVENOUS; SUBCUTANEOUS at 20:11

## 2024-03-11 RX ADMIN — HEPARIN SODIUM 4000 UNITS: 1000 INJECTION INTRAVENOUS; SUBCUTANEOUS at 13:10

## 2024-03-11 RX ADMIN — SODIUM CHLORIDE: 9 INJECTION, SOLUTION INTRAVENOUS at 13:13

## 2024-03-11 RX ADMIN — FENTANYL CITRATE 50 MCG: 50 INJECTION INTRAMUSCULAR; INTRAVENOUS at 23:56

## 2024-03-11 ASSESSMENT — PAIN DESCRIPTION - PAIN TYPE
TYPE: ACUTE PAIN

## 2024-03-11 ASSESSMENT — PAIN SCALES - GENERAL
PAINLEVEL_OUTOF10: 6
PAINLEVEL_OUTOF10: 3
PAINLEVEL_OUTOF10: 10
PAINLEVEL_OUTOF10: 3
PAINLEVEL_OUTOF10: 6
PAINLEVEL_OUTOF10: 5
PAINLEVEL_OUTOF10: 9
PAINLEVEL_OUTOF10: 5
PAINLEVEL_OUTOF10: 10
PAINLEVEL_OUTOF10: 5
PAINLEVEL_OUTOF10: 5

## 2024-03-11 ASSESSMENT — PAIN DESCRIPTION - LOCATION
LOCATION: GROIN
LOCATION: GROIN;ABDOMEN
LOCATION: ABDOMEN;GROIN
LOCATION: ABDOMEN;GROIN;BACK
LOCATION: BACK;ABDOMEN
LOCATION: ABDOMEN;LEG;GROIN

## 2024-03-11 ASSESSMENT — PAIN DESCRIPTION - ORIENTATION
ORIENTATION: RIGHT;LEFT
ORIENTATION: MID;LEFT
ORIENTATION: RIGHT;LEFT
ORIENTATION: RIGHT
ORIENTATION: RIGHT;LEFT

## 2024-03-11 ASSESSMENT — LIFESTYLE VARIABLES
HOW MANY STANDARD DRINKS CONTAINING ALCOHOL DO YOU HAVE ON A TYPICAL DAY: PATIENT DOES NOT DRINK
HOW OFTEN DO YOU HAVE A DRINK CONTAINING ALCOHOL: NEVER

## 2024-03-11 ASSESSMENT — PAIN - FUNCTIONAL ASSESSMENT
PAIN_FUNCTIONAL_ASSESSMENT: PREVENTS OR INTERFERES SOME ACTIVE ACTIVITIES AND ADLS
PAIN_FUNCTIONAL_ASSESSMENT: 0-10
PAIN_FUNCTIONAL_ASSESSMENT: PREVENTS OR INTERFERES SOME ACTIVE ACTIVITIES AND ADLS

## 2024-03-11 ASSESSMENT — PAIN DESCRIPTION - ONSET: ONSET: GRADUAL

## 2024-03-11 NOTE — ED NOTES
@4350 called Vascular Surgery for consult per  Gerald Hogue MD  RE: DVT  @1207 Lory came down and spoke to Dr. Hogue

## 2024-03-11 NOTE — H&P
Gender              Female   Patient Number     [id number]         Date of Birth       1985   Visit Number       [id number]          Age                 38 year(s)   Accession Number   [id number]         Room Number         05   Corporate ID       [id number]           Sonographer         Benjamin Hwang RDMS, RVT   Ordering Physician Gerald Hogue,  [facility]                     MD                 Physician           Readers  Procedure Type of Study:   Veins:Lower Extremities DVT Study, VASC EXTREMITY VENOUS DUPLEX BILATERAL.  Tech Comments Right The common femoral vein was difficult to attempt to compress due to patients intolerance to compression maneuvers. There is acute totally occluding deep venous thrombosis involving the common femoral vein, proximal - distal femoral vein, popliteal vein, deep femoral vein, posterior tibial veins and peroneal veins. The deep venous thrombosis did not extend into the iliac vein. There is acute totally occluding superficial venous thrombosis involving the saphenofemoral junction. There is no other evidence of deep or superficial venous thrombosis involving the right lower extremity. 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 ' There is no previous exam for comparison.        Electronically signed by Sher Page MD on 3/11/2024 at 2:24 PM

## 2024-03-11 NOTE — ED PROVIDER NOTES
[facility]  ED  EMERGENCY DEPARTMENT ENCOUNTER        Patient Name: Fernanda Duran  MRN: [id number]  Birthdate 1985  Date of evaluation: 3/11/2024  Provider: Gerald Hogue MD  PCP: No primary care provider on file.  Note Started: 10:02 AM EDT 3/11/24    CHIEF COMPLAINT       Groin Pain (Started Saturday. No known injury)      HISTORY OF PRESENT ILLNESS: 1 or more Elements     History from : Patient    Limitations to history : None    Fernanda Duran is a 38 y.o. female who presents for evaluation of right groin pain.  Patient states that started on Saturday.  She has taken Tylenol ibuprofen without significant treatment.  She denies any significant swelling to the right lower extremity.  She denies any traumatic injuries.  She may have had a fever several days ago.  No upper abdominal pain.  She has had prior appendectomy and .    Nursing Notes were all reviewed and agreed with or any disagreements were addressed in the HPI.    REVIEW OF SYSTEMS :      Review of Systems    Positives and Pertinent negatives as per HPI.     SURGICAL HISTORY     Past Surgical History:   Procedure Laterality Date    APPENDECTOMY       SECTION      COLPOSCOPY          DILATION AND CURETTAGE OF UTERUS      2006    TUBAL LIGATION         CURRENTMEDICATIONS       Previous Medications    FLUOXETINE (PROZAC) 20 MG CAPSULE    Take 20 mg by mouth daily    IBUPROFEN (ADVIL;MOTRIN) 400 MG TABLET    Take 1 tablet by mouth every 6 hours as needed for Pain    IBUPROFEN (ADVIL;MOTRIN) 600 MG TABLET    Take 1 tablet by mouth 3 times daily as needed for Pain       ALLERGIES     Morphine    FAMILYHISTORY       Family History   Problem Relation Age of Onset    Heart Attack Father     Heart Failure Maternal Grandmother     Cancer Maternal Grandfather         lung    Heart Attack Paternal Grandmother     Cancer Paternal Grandfather         lung        SOCIAL HISTORY       Social History     Tobacco Use

## 2024-03-12 ENCOUNTER — APPOINTMENT (OUTPATIENT)
Dept: CARDIAC CATH/INVASIVE PROCEDURES | Age: 39
DRG: 271 | End: 2024-03-12
Payer: MEDICAID

## 2024-03-12 LAB
ANION GAP SERPL CALCULATED.3IONS-SCNC: 7 MMOL/L (ref 3–16)
ANTI-XA UNFRAC HEPARIN: 0.13 IU/ML (ref 0.3–0.7)
ANTI-XA UNFRAC HEPARIN: 0.15 IU/ML (ref 0.3–0.7)
ANTI-XA UNFRAC HEPARIN: 0.25 IU/ML (ref 0.3–0.7)
BUN SERPL-MCNC: 10 MG/DL (ref 7–20)
CALCIUM SERPL-MCNC: 8.2 MG/DL (ref 8.3–10.6)
CHLORIDE SERPL-SCNC: 101 MMOL/L (ref 99–110)
CO2 SERPL-SCNC: 23 MMOL/L (ref 21–32)
CREAT SERPL-MCNC: <0.5 MG/DL (ref 0.6–1.1)
DEPRECATED RDW RBC AUTO: 13.3 % (ref 12.4–15.4)
GFR SERPLBLD CREATININE-BSD FMLA CKD-EPI: >60 ML/MIN/{1.73_M2}
GLUCOSE SERPL-MCNC: 106 MG/DL (ref 70–99)
HCT VFR BLD AUTO: 32.3 % (ref 36–48)
HGB BLD-MCNC: 11.2 G/DL (ref 12–16)
MCH RBC QN AUTO: 31.2 PG (ref 26–34)
MCHC RBC AUTO-ENTMCNC: 34.8 G/DL (ref 31–36)
MCV RBC AUTO: 89.8 FL (ref 80–100)
PLATELET # BLD AUTO: 208 K/UL (ref 135–450)
PMV BLD AUTO: 8.5 FL (ref 5–10.5)
POC ACT LR: 153 SEC
POC ACT LR: 204 SEC
POTASSIUM SERPL-SCNC: 3.8 MMOL/L (ref 3.5–5.1)
RBC # BLD AUTO: 3.6 M/UL (ref 4–5.2)
SODIUM SERPL-SCNC: 131 MMOL/L (ref 136–145)
WBC # BLD AUTO: 8.9 K/UL (ref 4–11)

## 2024-03-12 PROCEDURE — 6360000002 HC RX W HCPCS: Performed by: HOSPITALIST

## 2024-03-12 PROCEDURE — 04CH3ZZ EXTIRPATION OF MATTER FROM RIGHT EXTERNAL ILIAC ARTERY, PERCUTANEOUS APPROACH: ICD-10-PCS | Performed by: SURGERY

## 2024-03-12 PROCEDURE — 85520 HEPARIN ASSAY: CPT

## 2024-03-12 PROCEDURE — 6360000004 HC RX CONTRAST MEDICATION

## 2024-03-12 PROCEDURE — 04CC3ZZ EXTIRPATION OF MATTER FROM RIGHT COMMON ILIAC ARTERY, PERCUTANEOUS APPROACH: ICD-10-PCS | Performed by: SURGERY

## 2024-03-12 PROCEDURE — 36005 INJECTION EXT VENOGRAPHY: CPT

## 2024-03-12 PROCEDURE — B51B1ZZ FLUOROSCOPY OF RIGHT LOWER EXTREMITY VEINS USING LOW OSMOLAR CONTRAST: ICD-10-PCS | Performed by: SURGERY

## 2024-03-12 PROCEDURE — C1894 INTRO/SHEATH, NON-LASER: HCPCS | Performed by: SURGERY

## 2024-03-12 PROCEDURE — 37187 VENOUS MECH THROMBECTOMY: CPT

## 2024-03-12 PROCEDURE — 85027 COMPLETE CBC AUTOMATED: CPT

## 2024-03-12 PROCEDURE — 2580000003 HC RX 258: Performed by: HOSPITALIST

## 2024-03-12 PROCEDURE — 04CK3ZZ EXTIRPATION OF MATTER FROM RIGHT FEMORAL ARTERY, PERCUTANEOUS APPROACH: ICD-10-PCS | Performed by: SURGERY

## 2024-03-12 PROCEDURE — 36415 COLL VENOUS BLD VENIPUNCTURE: CPT

## 2024-03-12 PROCEDURE — 6370000000 HC RX 637 (ALT 250 FOR IP): Performed by: HOSPITALIST

## 2024-03-12 PROCEDURE — 6360000002 HC RX W HCPCS: Performed by: INTERNAL MEDICINE

## 2024-03-12 PROCEDURE — 75820 VEIN X-RAY ARM/LEG: CPT

## 2024-03-12 PROCEDURE — C1887 CATHETER, GUIDING: HCPCS | Performed by: SURGERY

## 2024-03-12 PROCEDURE — 2709999900 HC NON-CHARGEABLE SUPPLY: Performed by: SURGERY

## 2024-03-12 PROCEDURE — C1769 GUIDE WIRE: HCPCS | Performed by: SURGERY

## 2024-03-12 PROCEDURE — 37248 TRLUML BALO ANGIOP 1ST VEIN: CPT

## 2024-03-12 PROCEDURE — 2500000003 HC RX 250 WO HCPCS

## 2024-03-12 PROCEDURE — C1757 CATH, THROMBECTOMY/EMBOLECT: HCPCS | Performed by: SURGERY

## 2024-03-12 PROCEDURE — 6360000002 HC RX W HCPCS

## 2024-03-12 PROCEDURE — 2060000000 HC ICU INTERMEDIATE R&B

## 2024-03-12 PROCEDURE — 04CM3ZZ EXTIRPATION OF MATTER FROM RIGHT POPLITEAL ARTERY, PERCUTANEOUS APPROACH: ICD-10-PCS | Performed by: SURGERY

## 2024-03-12 PROCEDURE — 6370000000 HC RX 637 (ALT 250 FOR IP)

## 2024-03-12 PROCEDURE — C1725 CATH, TRANSLUMIN NON-LASER: HCPCS | Performed by: SURGERY

## 2024-03-12 PROCEDURE — 85347 COAGULATION TIME ACTIVATED: CPT

## 2024-03-12 PROCEDURE — 047C3ZZ DILATION OF RIGHT COMMON ILIAC ARTERY, PERCUTANEOUS APPROACH: ICD-10-PCS | Performed by: SURGERY

## 2024-03-12 PROCEDURE — 80048 BASIC METABOLIC PNL TOTAL CA: CPT

## 2024-03-12 PROCEDURE — 6370000000 HC RX 637 (ALT 250 FOR IP): Performed by: CLINICAL NURSE SPECIALIST

## 2024-03-12 PROCEDURE — 76499 UNLISTED DX RADIOGRAPHIC PX: CPT

## 2024-03-12 RX ORDER — HEPARIN SODIUM 1000 [USP'U]/ML
2000 INJECTION, SOLUTION INTRAVENOUS; SUBCUTANEOUS ONCE
Status: COMPLETED | OUTPATIENT
Start: 2024-03-12 | End: 2024-03-12

## 2024-03-12 RX ORDER — FENTANYL CITRATE 50 UG/ML
INJECTION, SOLUTION INTRAMUSCULAR; INTRAVENOUS
Status: COMPLETED | OUTPATIENT
Start: 2024-03-12 | End: 2024-03-12

## 2024-03-12 RX ORDER — OXYCODONE HYDROCHLORIDE 5 MG/1
5 TABLET ORAL EVERY 4 HOURS PRN
Status: DISCONTINUED | OUTPATIENT
Start: 2024-03-12 | End: 2024-03-13 | Stop reason: HOSPADM

## 2024-03-12 RX ORDER — MIDAZOLAM HYDROCHLORIDE 1 MG/ML
INJECTION INTRAMUSCULAR; INTRAVENOUS
Status: COMPLETED | OUTPATIENT
Start: 2024-03-12 | End: 2024-03-12

## 2024-03-12 RX ORDER — ASPIRIN 325 MG
325 TABLET ORAL ONCE
Status: COMPLETED | OUTPATIENT
Start: 2024-03-12 | End: 2024-03-12

## 2024-03-12 RX ORDER — OXYCODONE HYDROCHLORIDE 5 MG/1
10 TABLET ORAL EVERY 4 HOURS PRN
Status: DISCONTINUED | OUTPATIENT
Start: 2024-03-12 | End: 2024-03-13 | Stop reason: HOSPADM

## 2024-03-12 RX ORDER — MECOBALAMIN 5000 MCG
5 TABLET,DISINTEGRATING ORAL NIGHTLY
Status: DISCONTINUED | OUTPATIENT
Start: 2024-03-13 | End: 2024-03-13 | Stop reason: HOSPADM

## 2024-03-12 RX ORDER — HEPARIN SODIUM 1000 [USP'U]/ML
INJECTION, SOLUTION INTRAVENOUS; SUBCUTANEOUS
Status: COMPLETED | OUTPATIENT
Start: 2024-03-12 | End: 2024-03-12

## 2024-03-12 RX ORDER — ASPIRIN 81 MG/1
81 TABLET, CHEWABLE ORAL DAILY
Status: DISCONTINUED | OUTPATIENT
Start: 2024-03-13 | End: 2024-03-13 | Stop reason: HOSPADM

## 2024-03-12 RX ADMIN — OXYCODONE 10 MG: 5 TABLET ORAL at 21:35

## 2024-03-12 RX ADMIN — FENTANYL CITRATE 25 MCG: 50 INJECTION, SOLUTION INTRAMUSCULAR; INTRAVENOUS at 08:49

## 2024-03-12 RX ADMIN — OXYCODONE 10 MG: 5 TABLET ORAL at 14:09

## 2024-03-12 RX ADMIN — HYDROMORPHONE HYDROCHLORIDE 0.5 MG: 1 INJECTION, SOLUTION INTRAMUSCULAR; INTRAVENOUS; SUBCUTANEOUS at 06:42

## 2024-03-12 RX ADMIN — HYDROMORPHONE HYDROCHLORIDE 0.5 MG: 1 INJECTION, SOLUTION INTRAMUSCULAR; INTRAVENOUS; SUBCUTANEOUS at 12:12

## 2024-03-12 RX ADMIN — FENTANYL CITRATE 25 MCG: 50 INJECTION, SOLUTION INTRAMUSCULAR; INTRAVENOUS at 08:51

## 2024-03-12 RX ADMIN — HYDROMORPHONE HYDROCHLORIDE 0.5 MG: 1 INJECTION, SOLUTION INTRAMUSCULAR; INTRAVENOUS; SUBCUTANEOUS at 16:35

## 2024-03-12 RX ADMIN — MIDAZOLAM HYDROCHLORIDE 1 MG: 1 INJECTION INTRAMUSCULAR; INTRAVENOUS at 09:05

## 2024-03-12 RX ADMIN — FENTANYL CITRATE 25 MCG: 50 INJECTION, SOLUTION INTRAMUSCULAR; INTRAVENOUS at 08:30

## 2024-03-12 RX ADMIN — Medication 325 MG: at 09:43

## 2024-03-12 RX ADMIN — MIDAZOLAM HYDROCHLORIDE 1 MG: 1 INJECTION INTRAMUSCULAR; INTRAVENOUS at 08:44

## 2024-03-12 RX ADMIN — FENTANYL CITRATE 50 MCG: 50 INJECTION, SOLUTION INTRAMUSCULAR; INTRAVENOUS at 08:13

## 2024-03-12 RX ADMIN — MIDAZOLAM HYDROCHLORIDE 2 MG: 1 INJECTION INTRAMUSCULAR; INTRAVENOUS at 08:13

## 2024-03-12 RX ADMIN — HEPARIN SODIUM 2000 UNITS: 1000 INJECTION INTRAVENOUS; SUBCUTANEOUS at 18:22

## 2024-03-12 RX ADMIN — MIDAZOLAM HYDROCHLORIDE 1 MG: 1 INJECTION INTRAMUSCULAR; INTRAVENOUS at 08:32

## 2024-03-12 RX ADMIN — ACETAMINOPHEN 650 MG: 325 TABLET ORAL at 12:02

## 2024-03-12 RX ADMIN — HEPARIN SODIUM 1100 UNITS/HR: 10000 INJECTION, SOLUTION INTRAVENOUS at 10:24

## 2024-03-12 RX ADMIN — FENTANYL CITRATE 25 MCG: 50 INJECTION, SOLUTION INTRAMUSCULAR; INTRAVENOUS at 08:44

## 2024-03-12 RX ADMIN — FENTANYL CITRATE 25 MCG: 50 INJECTION, SOLUTION INTRAMUSCULAR; INTRAVENOUS at 08:31

## 2024-03-12 RX ADMIN — Medication 10 ML: at 21:37

## 2024-03-12 RX ADMIN — FENTANYL CITRATE 25 MCG: 50 INJECTION, SOLUTION INTRAMUSCULAR; INTRAVENOUS at 08:23

## 2024-03-12 RX ADMIN — HEPARIN SODIUM 2000 UNITS: 1000 INJECTION INTRAVENOUS; SUBCUTANEOUS at 04:09

## 2024-03-12 RX ADMIN — FENTANYL CITRATE 25 MCG: 50 INJECTION, SOLUTION INTRAMUSCULAR; INTRAVENOUS at 09:01

## 2024-03-12 RX ADMIN — OXYCODONE 10 MG: 5 TABLET ORAL at 18:21

## 2024-03-12 RX ADMIN — HEPARIN SODIUM 4000 UNITS: 1000 INJECTION, SOLUTION INTRAVENOUS; SUBCUTANEOUS at 08:34

## 2024-03-12 RX ADMIN — HYDROMORPHONE HYDROCHLORIDE 0.5 MG: 1 INJECTION, SOLUTION INTRAMUSCULAR; INTRAVENOUS; SUBCUTANEOUS at 22:25

## 2024-03-12 RX ADMIN — SODIUM CHLORIDE: 9 INJECTION, SOLUTION INTRAVENOUS at 23:02

## 2024-03-12 RX ADMIN — MIDAZOLAM HYDROCHLORIDE 1 MG: 1 INJECTION INTRAMUSCULAR; INTRAVENOUS at 08:55

## 2024-03-12 RX ADMIN — ONDANSETRON 4 MG: 2 INJECTION INTRAMUSCULAR; INTRAVENOUS at 02:08

## 2024-03-12 ASSESSMENT — PAIN SCALES - GENERAL
PAINLEVEL_OUTOF10: 10
PAINLEVEL_OUTOF10: 8
PAINLEVEL_OUTOF10: 8
PAINLEVEL_OUTOF10: 3
PAINLEVEL_OUTOF10: 8
PAINLEVEL_OUTOF10: 9
PAINLEVEL_OUTOF10: 10
PAINLEVEL_OUTOF10: 8
PAINLEVEL_OUTOF10: 9
PAINLEVEL_OUTOF10: 8

## 2024-03-12 ASSESSMENT — PAIN DESCRIPTION - LOCATION
LOCATION: ABDOMEN;GROIN
LOCATION: KNEE
LOCATION: KNEE
LOCATION: LEG
LOCATION: LEG
LOCATION: KNEE

## 2024-03-12 ASSESSMENT — PAIN DESCRIPTION - ORIENTATION
ORIENTATION: OUTER
ORIENTATION: RIGHT
ORIENTATION: RIGHT
ORIENTATION: OUTER;INNER

## 2024-03-12 ASSESSMENT — PAIN DESCRIPTION - DESCRIPTORS
DESCRIPTORS: STABBING
DESCRIPTORS: THROBBING;STABBING;SHOOTING;SHARP
DESCRIPTORS: STABBING

## 2024-03-12 ASSESSMENT — PAIN - FUNCTIONAL ASSESSMENT: PAIN_FUNCTIONAL_ASSESSMENT: PREVENTS OR INTERFERES SOME ACTIVE ACTIVITIES AND ADLS

## 2024-03-12 ASSESSMENT — PAIN DESCRIPTION - PAIN TYPE: TYPE: ACUTE PAIN

## 2024-03-12 NOTE — CONSULTS
Pharmacy to Manage Heparin Infusion per Hospital Nomogram    Dx: DVT of RLE  Weight: 49.9 kg  Baseline aPTT: 32.8 sec at 10:03    Labs:  Recent Labs     03/11/24  1003   HGB 13.0   HCT 38.9      INR 1.10   APTT 32.8     Imaging:   CT Abd Pelvis - \"Acute deep venous thrombosis extending from right femoral vein through the right common iliac vein nearly to the IVC.\"    Heparin (weight-based) Infusion: VTE/DVT/PE  Will initiate heparin therapy with a bolus of 4000 units (80 units/kg) followed by heparin infusion at 900 units/hr (18 units/kg/hr).  Plan to check anti-Xa in 6 hours.  Goal anti-Xa 0.3 - 0.7 units/mL    Thank you for the consult!   Zahra Teague, PharmD, BCCCP [id number]      3/11/2024  Recent Labs     03/11/24  1937   ANTIXAUHEP 0.12*   - Heparin _2000_ units IVP bolus and then increase Heparin infusion to _1000_ units/hr.   - Recheck Anti-Xa in 6 hours at 0200.  Mason Ramirez, PharmD    3/11/2024 8:03 PM     3/12/24 at 0203  Anti-Xa: 0.15  Plan: Give 2000 units of heparin as bolus, increase heparin infusion rate to 1100 units./hr, recheck anti-Xa at 0930  Flaquito Quiñones PharmD 3/12/2024  3:21 AM    3/12/24 at 1739  Anti-Xa: 0.13  Plan: Give 2000 units of heparin as bolus, increase heparin infusion rate to 1200 units./hr, recheck anti-Xa at 0000    Ariel Rosado PharmD 3/12/2024 6:11 PM    3/12/24 at 2323   Anti-Xa: 0.25  Plan: give 2000 units of heparin as bolus, increase heparin infusion rate to 1300 units/hr, recheck anti-Xa at 0630  Flaquito Quiñones PharmD 3/13/2024  12:02 AM    3/13/24 at 0512  Anti-Xa: 0.30  Plan: Continue current heparin infusion, recheck anti-Xa at 1330  Flaquito Quiñones PharmD 3/13/2024  7:20 AM    3/13/24 at 1320  Anti-Xa: 0.42  Plan: Continue current heparin infusion at 1300 units/hr, recheck anti-Xa tomorrow AM    Ariel Rosado, PharmD 3/13/2024 2:06 PM      
  [facility]           Vascular Surgery Consultation  Fernanda Duran  Medical Record #: [id number]  YOB: 1985      Date of Admission:  3/11/2024  9:48 AM  Date of Consultation:  3/11/2024      PCP:  No primary care provider on file.       Chief Complaint: Right groin pain    History of Present Illness:   We are asked to see this patient in consultation by Dr Hogue regarding right lower extremity DVT.    Fernanda Duran is a 38 y.o. female with a past medical history of Factor V Leiden mutation, recurrent DVT during pregnancies (7), bipolar disease who presented to the ED with right groin and knee pain. Patient she woke up Saturday with generalized achiness and low grade fever. It went away by  but reoccurred this morning. She came in to be evaluated. Patient states she has a history of recurrent DVT and was told she has Factor V Leiden mutation since age 13. She was only treated with Lovenox during her pregnancy and has not been on anticoagulation for maintenance. Many of her family members also have Factor V Leiden mutation. In the ED, a CT abd/pelvis and lower extremity venous duplex confirmed an acute DVT. She was initally tachycardic (rate 122) but is NSR in the 80/s. Vascular surgery was consulted.       Past Medical History:   Diagnosis Date    Anxiety     no meds at present    Bipolar 1 disorder (HCC)     Depression     DVT (deep vein thrombosis) in pregnancy     Herpes 2013    genital    STD (sexually transmitted disease) 2013    herpes   Previous history of heroin abuse - occas use of friend's suboxon  Tobacco use      Past Surgical History:   Procedure Laterality Date    APPENDECTOMY       SECTION      COLPOSCOPY      2013    DILATION AND CURETTAGE OF UTERUS      2006    TUBAL LIGATION         Home Medications:   Prior to Admission medications    Medication Sig Start Date End Date Taking? Authorizing Provider   ibuprofen 
unremarkable   - Lupus anticoag unremarkable    - cardiolipin antibodies unremarkable    - Factor Two unable to see these results    - Factor V- unable to see these results (patient states she is positive for this)   - MTHFR Mutation heterozygous [id number]   - Antithrombin antigen unremarkable    - APC resistance panel unremarkable   - Protein C unremarkable   - Protein S low at 44%   - homocysteine unremarkable    - Plasmin A1 1 Inhib unremarkable   - Alpha 2 antiplasm act 136%  - family history of Factor V Leiden mutation  - patient denies recent surgery, long distance traveling  - CT AP 03/11 showed acute DVT extending from right femoral vein through the right common iliac vein nearly to the IVC  - Doppler US 03/11 showed acute totally occluding DVT involving the common femoral vein, proximal distal femoral vein, popliteal vein, deep femoral vein, posterior tibial veins and peroneal veins. Evidence of chronic phlebitic process involving the common femoral vein.   - not maintained on any form of AC prior to admission   - started on heparin inpatient   - Vascular performed mechanical thrombectomy 03/12 found to have significant iliofemoral thrombus and stenosis of common iliac vein treated with armada balloon  - would recommend transitioning to Eliquis or Xarelto or Coumadin at discharge pending insurance and affordability. Patient does not have insurance at this time. This will need to be established as she will need lifelong anticoagulation in light of Factor V Leiden mutation positive and history of recurrent DVTs with acute DVT unprovoked.   - patient will also need established with PCP at discharge     >50 minutes spent reviewing outside records, labs, imaging and discussion with patient, patients mother, IM, and vascular.     Thank you for asking me to see the patient.       JEFE ROJO  Please Contact Through Perfect Serve

## 2024-03-12 NOTE — PRE SEDATION
Sedation Pre-Procedure Note    Patient Name: Fernanda Duran   YOB: 1985  Room/Bed: Cath Pool Rm/NONE  Medical Record Number: [id number]  Date: 3/12/2024   Time: 9:30 AM       Indication:  RLE iliofemoral DVT    Consent: I have discussed with the patient and/or the patient representative the indication, alternatives, and the possible risks and/or complications of the planned procedure and the anesthesia methods. The patient and/or patient representative appear to understand and agree to proceed.    Vital Signs:   Vitals:    24 0708   BP: 110/70   Pulse: 91   Resp:    Temp:    SpO2: 98%       Past Medical History:   has a past medical history of Anxiety, Bipolar 1 disorder (HCC), Depression, DVT (deep vein thrombosis) in pregnancy, Herpes, and STD (sexually transmitted disease).    Past Surgical History:   has a past surgical history that includes Appendectomy;  section; Dilation and curettage of uterus; Colposcopy; and Tubal ligation.    Medications:   Scheduled Meds:    aspirin  325 mg Oral Once    [START ON 3/13/2024] aspirin  81 mg Oral Daily    sodium chloride flush  5-40 mL IntraVENous 2 times per day    FLUoxetine  20 mg Oral Daily    nicotine  1 patch TransDERmal Daily     Continuous Infusions:    heparin (PORCINE) Infusion Stopped (24 0824)    sodium chloride      sodium chloride 75 mL/hr at 24 1313     PRN Meds: heparin (porcine), heparin (porcine), sodium chloride flush, sodium chloride, potassium chloride **OR** potassium alternative oral replacement **OR** potassium chloride, magnesium sulfate, ondansetron **OR** ondansetron, polyethylene glycol, acetaminophen **OR** acetaminophen, HYDROmorphone  Home Meds:   Prior to Admission medications    Medication Sig Start Date End Date Taking? Authorizing Provider   ibuprofen (ADVIL;MOTRIN) 600 MG tablet Take 1 tablet by mouth 3 times daily as needed for Pain 22   Jaxon Bautista MD   FLUoxetine (PROZAC) 20 MG

## 2024-03-12 NOTE — BRIEF OP NOTE
Brief Postoperative Note      Patient: Fernanda Duran  YOB: 1985  MRN: [id number]    Date of Procedure: 3/11/2024    R iliofemoral DVT  R common iliac vein stenosis    Post-Op Diagnosis: Same       * No procedures listed *    * No surgeons listed *    Assistant:  * No surgical staff found *    Anesthesia: * No anesthesia type entered *    Estimated Blood Loss (mL):  50     Complications: None    Specimens:   * No specimens in log *    Implants:  * No implants in log *      Drains: * No LDAs found *    Findings: significant iliofemoral thrombus. Stenosis of the common iliac vein noted on angiogram.  Treated with [id number] armada balloon. Good flow into the IVC at completion of procedure.   [id number]    Electronically signed by Fercho Reaves MD on 3/12/2024 at 9:31 AM

## 2024-03-13 VITALS
WEIGHT: 110 LBS | BODY MASS INDEX: 20.24 KG/M2 | SYSTOLIC BLOOD PRESSURE: 94 MMHG | HEART RATE: 83 BPM | OXYGEN SATURATION: 98 % | DIASTOLIC BLOOD PRESSURE: 60 MMHG | HEIGHT: 62 IN | RESPIRATION RATE: 16 BRPM | TEMPERATURE: 98.6 F

## 2024-03-13 LAB
ANTI-XA UNFRAC HEPARIN: 0.3 IU/ML (ref 0.3–0.7)
ANTI-XA UNFRAC HEPARIN: 0.42 IU/ML (ref 0.3–0.7)

## 2024-03-13 PROCEDURE — 85520 HEPARIN ASSAY: CPT

## 2024-03-13 PROCEDURE — 97116 GAIT TRAINING THERAPY: CPT

## 2024-03-13 PROCEDURE — 6370000000 HC RX 637 (ALT 250 FOR IP): Performed by: NURSE PRACTITIONER

## 2024-03-13 PROCEDURE — 36415 COLL VENOUS BLD VENIPUNCTURE: CPT

## 2024-03-13 PROCEDURE — 6370000000 HC RX 637 (ALT 250 FOR IP): Performed by: SURGERY

## 2024-03-13 PROCEDURE — 97162 PT EVAL MOD COMPLEX 30 MIN: CPT

## 2024-03-13 PROCEDURE — 6360000002 HC RX W HCPCS: Performed by: HOSPITALIST

## 2024-03-13 PROCEDURE — 6370000000 HC RX 637 (ALT 250 FOR IP): Performed by: CLINICAL NURSE SPECIALIST

## 2024-03-13 PROCEDURE — 6370000000 HC RX 637 (ALT 250 FOR IP): Performed by: HOSPITALIST

## 2024-03-13 PROCEDURE — 6360000002 HC RX W HCPCS: Performed by: INTERNAL MEDICINE

## 2024-03-13 RX ORDER — MECOBALAMIN 5000 MCG
5 TABLET,DISINTEGRATING ORAL NIGHTLY
Qty: 30 TABLET | Refills: 1 | Status: SHIPPED | OUTPATIENT
Start: 2024-03-13

## 2024-03-13 RX ORDER — NICOTINE 21 MG/24HR
1 PATCH, TRANSDERMAL 24 HOURS TRANSDERMAL DAILY
Qty: 30 PATCH | Refills: 3 | Status: SHIPPED | OUTPATIENT
Start: 2024-03-14

## 2024-03-13 RX ORDER — ASPIRIN 81 MG/1
81 TABLET, CHEWABLE ORAL DAILY
Qty: 30 TABLET | Refills: 3 | Status: SHIPPED | OUTPATIENT
Start: 2024-03-14

## 2024-03-13 RX ORDER — HEPARIN SODIUM 1000 [USP'U]/ML
2000 INJECTION, SOLUTION INTRAVENOUS; SUBCUTANEOUS ONCE
Status: COMPLETED | OUTPATIENT
Start: 2024-03-13 | End: 2024-03-13

## 2024-03-13 RX ORDER — OXYCODONE HYDROCHLORIDE 5 MG/1
5 TABLET ORAL EVERY 6 HOURS PRN
Qty: 12 TABLET | Refills: 0 | Status: SHIPPED | OUTPATIENT
Start: 2024-03-13 | End: 2024-03-16

## 2024-03-13 RX ADMIN — HYDROMORPHONE HYDROCHLORIDE 0.5 MG: 1 INJECTION, SOLUTION INTRAMUSCULAR; INTRAVENOUS; SUBCUTANEOUS at 09:13

## 2024-03-13 RX ADMIN — HEPARIN SODIUM 2000 UNITS: 1000 INJECTION INTRAVENOUS; SUBCUTANEOUS at 00:26

## 2024-03-13 RX ADMIN — HEPARIN SODIUM 1300 UNITS/HR: 10000 INJECTION, SOLUTION INTRAVENOUS at 09:24

## 2024-03-13 RX ADMIN — HYDROMORPHONE HYDROCHLORIDE 0.5 MG: 1 INJECTION, SOLUTION INTRAMUSCULAR; INTRAVENOUS; SUBCUTANEOUS at 04:26

## 2024-03-13 RX ADMIN — OXYCODONE 10 MG: 5 TABLET ORAL at 06:59

## 2024-03-13 RX ADMIN — ASPIRIN 81 MG: 81 TABLET, CHEWABLE ORAL at 09:13

## 2024-03-13 RX ADMIN — OXYCODONE 10 MG: 5 TABLET ORAL at 11:47

## 2024-03-13 RX ADMIN — OXYCODONE 10 MG: 5 TABLET ORAL at 15:33

## 2024-03-13 RX ADMIN — OXYCODONE 10 MG: 5 TABLET ORAL at 01:39

## 2024-03-13 ASSESSMENT — PAIN DESCRIPTION - LOCATION
LOCATION: ANKLE;FOOT
LOCATION: FOOT;ANKLE
LOCATION: ANKLE
LOCATION: FOOT;KNEE;LEG
LOCATION: ANKLE;FOOT

## 2024-03-13 ASSESSMENT — PAIN SCALES - GENERAL
PAINLEVEL_OUTOF10: 7
PAINLEVEL_OUTOF10: 10
PAINLEVEL_OUTOF10: 8
PAINLEVEL_OUTOF10: 7
PAINLEVEL_OUTOF10: 8
PAINLEVEL_OUTOF10: 8

## 2024-03-13 ASSESSMENT — PAIN DESCRIPTION - DESCRIPTORS
DESCRIPTORS: BURNING

## 2024-03-13 ASSESSMENT — PAIN DESCRIPTION - ORIENTATION: ORIENTATION: RIGHT

## 2024-03-13 NOTE — CARE COORDINATION
CASE MANAGEMENT DISCHARGE SUMMARY      Discharge to: Home with RW from [facility] and meds to beds for meds to be filled here at our outpt pharmacy. No PCP or insurance other than Medicaid pending/ referral to [facility]      New Durable Medical Equipment ordered/agency: RW    Transportation:    Family/car:On arrival     Confirmed discharge plan with:MD/RN     Patient: yes       RN, name: Zahra

## 2024-03-13 NOTE — ONCOLOGY
Xarelto or Coumadin at discharge pending insurance and affordability. Patient does not have insurance at this time. This will need to be established as she will need lifelong anticoagulation in light of Factor V Leiden mutation positive and history of recurrent DVTs with acute DVT unprovoked.   - patient will also need established with PCP at discharge       ONCOLOGIC DISPOSITION:  Once transitioned to oral anticoagulation.    Mukul Rubio MD  Please contact through Perfect Serve

## 2024-03-13 NOTE — PLAN OF CARE
Problem: Discharge Planning  Goal: Discharge to home or other facility with appropriate resources  Outcome: Progressing     Problem: Pain  Goal: Verbalizes/displays adequate comfort level or baseline comfort level  Outcome: Progressing     Problem: Risk for Elopement  Goal: Patient will not exit the unit/facility without proper excort  Outcome: Progressing     Problem: Safety - Adult  Goal: Free from fall injury  Outcome: Progressing     Problem: Neurosensory - Adult  Goal: Achieves stable or improved neurological status  Outcome: Progressing     Problem: Respiratory - Adult  Goal: Achieves optimal ventilation and oxygenation  Outcome: Progressing     Problem: Cardiovascular - Adult  Goal: Maintains optimal cardiac output and hemodynamic stability  Outcome: Progressing     Problem: Skin/Tissue Integrity - Adult  Goal: Skin integrity remains intact  Outcome: Progressing     Problem: Musculoskeletal - Adult  Goal: Return mobility to safest level of function  Outcome: Progressing     Problem: Gastrointestinal - Adult  Goal: Minimal or absence of nausea and vomiting  Outcome: Progressing     Problem: Genitourinary - Adult  Goal: Absence of urinary retention  Outcome: Progressing     Problem: Infection - Adult  Goal: Absence of infection at discharge  Outcome: Progressing     Problem: Metabolic/Fluid and Electrolytes - Adult  Goal: Electrolytes maintained within normal limits  Outcome: Progressing     Problem: Hematologic - Adult  Goal: Maintains hematologic stability  Outcome: Progressing

## 2024-03-13 NOTE — OP NOTE
[address] [address]                            OPERATIVE REPORT      PATIENT NAME: DILCIA DAVILA           : 1985  MED REC NO: [id number]                      ROOM: 0425  ACCOUNT NO: [id number]                       ADMIT DATE: 2024  PROVIDER: Fercho Reaves MD      DATE OF PROCEDURE:  2024    SURGEON:  Fercho Reaves MD    PROCEDURES PERFORMED:    1. Ultrasound-guided cannulation of right popliteal vein.  2. Right lower extremity venogram.  3. Right iliofemoral and inferior vena cavogram.  4. Selective catheterization, left subclavian vein.  5. Percutaneous mechanical thrombectomy of the right common iliac, external iliac, and femoropopliteal veins of the right lower extremity for acute deep venous thrombosis.  6. Balloon angioplasty of the right common iliac vein using 12 x 80 mm [place] balloon.    PREOPERATIVE DIAGNOSIS:  Right iliofemoral and femoropopliteal deep venous thrombosis.    POSTOPERATIVE DIAGNOSES:  Right iliofemoral and femoropopliteal deep venous thrombosis, and right common iliac vein stenosis.    ASSISTANT:  None.    ANESTHESIA:  Moderate sedation.    ESTIMATED BLOOD LOSS:  50 mL.    IMPLANTS:  None.    SPECIMEN:  None.    COMPLICATIONS:  None.    INTRAOPERATIVE/RADIOLOGIC FINDINGS:  Significant thrombus extending from the distal femoropopliteal vein all the way through the proximal common iliac vein.  No thrombus noted in the IVC.  Thrombectomy performed with return of significant thrombus.  There was minimal residual thrombus after thrombectomy with widely patent femoral vein, common femoral vein, popliteal vein, external iliac and common iliac vein.  The common iliac vein was noted to have a severe approximately 80% luminal narrowing stenosis.  This was treated with a 12 x 80 mm [place] balloon with improved luminal diameter and improved flow on completion imaging.  Good flow of contrast into

## 2024-03-13 NOTE — DISCHARGE SUMMARY
V2.0  Discharge Summary    Name:  Fernanda Duran /Age/Sex: 1985 (38 y.o. female)   Admit Date: 3/11/2024  Discharge Date: 3/13/24    MRN & CSN:  [id number] & [id number] Encounter Date and Time 3/13/24 1:26 PM EDT    Attending:  Mike Kilpatrick MD Discharging Provider: RHONDA Leavitt [facility]       Hospital Course:     Brief HPI: Fernanda Duran is a 38 y.o. female who presented with a pmh of factor V Leiden mutation, recurrent DVT who presents with Acute deep vein thrombosis of femoral vein of right lower extremity     Brief Problem Based Course:   Acute DVT of femoral vein in RLE  - Vascular surgery consulted and following  - D/C Heparin gtt  - RLE mechanical thrombectomy yesterday with Dr. Reaves.   - Oxycodone 5 mg q6 PRN x days  -Start Eliquis 10 mg BID X10 then switch to Eliquis 5 mg BID (Lifelong anticoagulant)  - Dressing to remain untouched until seen by VS in AM.  -F/U with surgeon for suture removal     ? Factor V Leiden  W/O DVT in  during pregnancy  - Hem/Onc consulted; appreciate their input  - Hypercoagulable Workup 2013:              - B2 glycoprotein unremarkable              - Lupus anticoag unremarkable               - cardiolipin antibodies unremarkable               - Factor Two unable to see these results    - Factor V- unable to see these results (patient states she is positive for this)              - MTHFR Mutation heterozygous [id number]              - Antithrombin antigen unremarkable               - APC resistance panel unremarkable              - Protein C unremarkable              - Protein S low at 44%              - homocysteine unremarkable               - Plasmin A1 1 Inhib unremarkable              - Alpha 2 antiplasm act 136%  - family history of Factor V Leiden mutation  - CT AP  showed acute DVT extending from right femoral vein through the right common iliac vein nearly to the IVC  - Doppler US  showed acute totally occluding DVT involving

## 2024-03-13 NOTE — CARE COORDINATION
Patient will be pending Medicaid per FC. Needs RW and HHPT. Zuri to provide RW . Gave patient information on [facility] for no PCP and no insurance. D/C order noted but patient is on a heparin gtt and cannot be discharged until oral anticoagulation is determined. Navi served MD to clarify.

## 2024-03-13 NOTE — CARE COORDINATION
Chart reviewed. No insurance or PCP listed. Can refer to [facility] for no primary care for follow up. Here for DVT related to factor V Leiden Mutation. Thrombectomy with Dr. Leary yesterday. From home with kenzie. Consult to financial counselor for Medicaid screening.

## 2024-03-14 NOTE — PROGRESS NOTES
Hospital Medicine Progress Note      Date of Admission: 3/11/2024  Hospital Day: 2    Chief Admission Complaint:      Groin Pain (Started Saturday. No known injury)         Subjective:      Patient in cath lab after thrombectomy with Dr. Reaves. She is resting but states her pain is  7/10 especially in her R knee. Otherwise no complaint    Presenting Admission History:       Fernanda Duran is a 38 y.o. female with pmh of factor V Leiden mutation, recurrent DVT who presents with above complaint.  She states she started having moderate to severe right groin pain since Saturday.  States initially she tried Tylenol and thought that his arthritis but pain was getting worse and she was having difficulty ambulating, so she decided to come to ED.  In ED CT abdomen/pelvis with IV contrast was done and she was found to have acute DVT of right femoral vein extending almost to IVC.  Vascular surgery was already consulted by ED physician and they recommended to start patient on heparin drip and they will decide about further management.    Assessment/Plan:      Current Principal Problem:  Acute deep vein thrombosis (DVT) of femoral vein of right lower extremity (HCC)    Acute DVT of femoral vein in RLE  - Vascular surgery consulted and following  - Continue Heparin gtt  - RLE mechanical thrombectomy today with Dr. Reaves.   - Continue current pain regimen with Ocycodone and Dilaudid for break through pain.   - Dressing to remain untouched until seen by VS in AM.    ? Factor V Leiden  W/O DVT in 2014 during pregnancy  - Hem/Onc consulted; appreciate their input  - Hypercoagulable Workup 06/24/2013:              - B2 glycoprotein unremarkable              - Lupus anticoag unremarkable               - cardiolipin antibodies unremarkable               - Factor Two unable to see these results    - Factor V- unable to see these results (patient states she is positive for this)              - MTHFR Mutation heterozygous [id number]    
  Physician Progress Note      PATIENT:               DILCIA DAVILA  [facility] #:                  [id number]  :                       1985  ADMIT DATE:       3/11/2024 9:48 AM  DISCH DATE:        3/13/2024 4:00 PM  RESPONDING  PROVIDER #:        POLO Casas CNP          QUERY TEXT:    Patient admitted with DVT and noted to have WBC 12.8, HR . If possible,   please document in progress notes and discharge summary if you are evaluating   and/or treating any of the following:    The medical record reflects the following:  Risk Factors: DVT  Clinical Indicators: WBC 12.8, HR   PN -  \"Leukocytosis 2/2 acute DVT  - Monitor CBC daily\"  Treatment: IVF, heparin gtt, UA, serial labs, and supportive care    Thank you,  Kathy Barreto, RN, BSN , CRCR  Options provided:  -- SIRS of non-infectious origin due to DVT without acute organ dysfunction  -- Other - I will add my own diagnosis  -- Disagree - Not applicable / Not valid  -- Disagree - Clinically unable to determine / Unknown  -- Refer to Clinical Documentation Reviewer    PROVIDER RESPONSE TEXT:    Provider disagreed with this query.  Already documented at discharge. Thank you    Query created by: Kathy Barreto on 3/13/2024 10:22 AM      Electronically signed by:  POLO Casas CNP 3/14/2024 12:45 PM          
11:17 PM   Patient still crying in pain. Pt states pain meds are not easing pain at all. NP notified. Pt's mother at bedside.      
2135:  Patient c/o pain in knee so RN gave 10 mg oxycodone.    2211:  Patient called out still c/o severe pain in knee. Patient is almost yelling at this point. Primary RN contacted NP Martinez Mancini about giving patient ordered dilaudid now. NP states it is okay to give.    2215:  Pulled 1mg dilaudid from [facility]. Wasted with Skip Desouza RN.  Primary RN accidentally squeezed remaining 0.5 ml of dilaudid onto floor, also witnessed by SHANEKA Valdivia.    Contacted the charge nurse, Rey. Was instructed to pull another dose of dilaudid and waste again accordingly.  
Discharge instructions, along with upcoming appointment information and new medications reviewed with patient; pt verbalized understanding. Tele monitor removed & logged, CMU aware. Peripheral IV removed w/o complication. Patient dressed and personal belongings packed.    
Pt tearful complaining of pain in her abdomen now as well as groin pain. VSS. Perfect serve page sent to CNP regarding increased pain, 1x dose IV fentanyl ordered per CNP Addis.  
Pt to cath lab with transport, CMU notified of transfer, mother at bedside.  
Pt wheel over from ER by RN, brought to A2 rm 220. Pt VVS, belongings at bedside, bed locked and in lowest position, call light and bedside table within reach.   
Report given to patients nurse Aziza MUNROE. Pt transferred to room. CMU called and monitor is on and verified. RLE remains elevated.  Ace wrap intact to RLE.  Family at bedside.  
Vascular    S/P LLE thrombectomy  Popliteal access site without bleeding or hematoma  Ok to switch to oral anticoagulation and discharge. \  ACE wrap or compression stocking to LLE when out of bed.    Outpatient f/u for suture removal next week.   
None  How much help is needed moving to and from a bed to a chair?: A Little  How much help is needed standing up from a chair using your arms?: A Little  How much help is needed walking in hospital room?: A Little  How much help is needed climbing 3-5 steps with a railing?: A Lot  AM-PAC Inpatient Mobility Raw Score : 19  AM-PAC Inpatient T-Scale Score : 45.44  Mobility Inpatient CMS 0-100% Score: 41.77  Mobility Inpatient CMS G-Code Modifier : CK             Goals  Short Term Goals  Time Frame for Short Term Goals: 3/20  Short Term Goal 1: Pt will demonstrate independence with bed mobility  Short Term Goal 2: Pt will demonstrate sit to stand with modified independence and LRAD  Short Term Goal 3: Pt will ambulate 50' w/ LRAD and SBA  Short Term Goal 4: Pt will tolerate stair assessment  Additional Goals?: No  Long Term Goals  Time Frame for Long Term Goals : 3/22  Long Term Goal 1: Pt will ambulate 150' w/ LRAD and modified independence  Additional Goals?: No  Patient Goals   Patient Goals : To go home       Education  Patient Education  Education Given To: Patient;Family  Education Provided: Role of Therapy;Plan of Care;Precautions;Transfer Training;Orientation;Equipment;Family Education  Education Method: Verbal;Demonstration  Barriers to Learning: None  Education Outcome: Continued education needed      Therapy Time   Individual Concurrent Group Co-treatment   Time In 1044         Time Out 1118         Minutes 34         Timed Code Treatment Minutes: 24 Minutes + 10 minute initial evaluation       KOSTA GREENBERG, PT     If pt is unable to be seen after this session, please let this note serve as discharge summary.  Please see case management note for discharge disposition.  Thank you.

## 2024-03-15 ENCOUNTER — TELEPHONE (OUTPATIENT)
Dept: VASCULAR SURGERY | Age: 39
End: 2024-03-15

## 2024-03-15 NOTE — TELEPHONE ENCOUNTER
Returned call to patient regarding pain medication refill. Per Dr Leary should not be having that much pain from this and should take extra strength Tylenol. Pamastrid

## 2024-03-15 NOTE — TELEPHONE ENCOUNTER
Pt called in regards to a rx refill. Pt stated she is still in pain from her surgery 3/12 and was inquiring about a possible refill on her pain medication. Best callback number is [phone].       Please advise.

## 2024-03-22 ENCOUNTER — OFFICE VISIT (OUTPATIENT)
Dept: VASCULAR SURGERY | Age: 39
End: 2024-03-22

## 2024-03-22 VITALS
SYSTOLIC BLOOD PRESSURE: 90 MMHG | WEIGHT: 110 LBS | BODY MASS INDEX: 20.24 KG/M2 | HEIGHT: 62 IN | DIASTOLIC BLOOD PRESSURE: 60 MMHG

## 2024-03-22 DIAGNOSIS — Z09 POSTOPERATIVE EXAMINATION: Primary | ICD-10-CM

## 2024-03-22 PROCEDURE — 99024 POSTOP FOLLOW-UP VISIT: CPT | Performed by: SURGERY

## 2024-03-22 NOTE — PROGRESS NOTES
Postop Progress Note    Subjective    Fernanda Duran presents to the office for postop follow up.    Objective    Vitals:    03/22/24 1100   BP: 90/60     General: alert, cooperative and no distress  Pop access site intact      Assessment  Doing well s/p thrombectomy    Plan  Suture removed   Recc leg elevation and compression.     Electronically signed by Wyatt Leary MD on 3/22/2024 at 11:19 AM

## 2024-04-23 ENCOUNTER — OFFICE VISIT (OUTPATIENT)
Dept: INTERNAL MEDICINE CLINIC | Age: 39
End: 2024-04-23

## 2024-04-23 VITALS
WEIGHT: 110 LBS | DIASTOLIC BLOOD PRESSURE: 84 MMHG | SYSTOLIC BLOOD PRESSURE: 131 MMHG | BODY MASS INDEX: 20.24 KG/M2 | HEIGHT: 62 IN | HEART RATE: 99 BPM | OXYGEN SATURATION: 100 %

## 2024-04-23 DIAGNOSIS — I82.411 ACUTE DEEP VEIN THROMBOSIS (DVT) OF FEMORAL VEIN OF RIGHT LOWER EXTREMITY (HCC): Primary | ICD-10-CM

## 2024-04-23 DIAGNOSIS — F17.200 CURRENT SMOKER: ICD-10-CM

## 2024-04-23 PROCEDURE — 99213 OFFICE O/P EST LOW 20 MIN: CPT | Performed by: INTERNAL MEDICINE

## 2024-04-23 ASSESSMENT — PATIENT HEALTH QUESTIONNAIRE - PHQ9
7. TROUBLE CONCENTRATING ON THINGS, SUCH AS READING THE NEWSPAPER OR WATCHING TELEVISION: NOT AT ALL
SUM OF ALL RESPONSES TO PHQ QUESTIONS 1-9: 0
5. POOR APPETITE OR OVEREATING: NOT AT ALL
SUM OF ALL RESPONSES TO PHQ9 QUESTIONS 1 & 2: 0
3. TROUBLE FALLING OR STAYING ASLEEP: NOT AT ALL
6. FEELING BAD ABOUT YOURSELF - OR THAT YOU ARE A FAILURE OR HAVE LET YOURSELF OR YOUR FAMILY DOWN: NOT AT ALL
SUM OF ALL RESPONSES TO PHQ QUESTIONS 1-9: 0
1. LITTLE INTEREST OR PLEASURE IN DOING THINGS: NOT AT ALL
9. THOUGHTS THAT YOU WOULD BE BETTER OFF DEAD, OR OF HURTING YOURSELF: NOT AT ALL
SUM OF ALL RESPONSES TO PHQ QUESTIONS 1-9: 0
2. FEELING DOWN, DEPRESSED OR HOPELESS: NOT AT ALL
8. MOVING OR SPEAKING SO SLOWLY THAT OTHER PEOPLE COULD HAVE NOTICED. OR THE OPPOSITE, BEING SO FIGETY OR RESTLESS THAT YOU HAVE BEEN MOVING AROUND A LOT MORE THAN USUAL: NOT AT ALL
10. IF YOU CHECKED OFF ANY PROBLEMS, HOW DIFFICULT HAVE THESE PROBLEMS MADE IT FOR YOU TO DO YOUR WORK, TAKE CARE OF THINGS AT HOME, OR GET ALONG WITH OTHER PEOPLE: NOT DIFFICULT AT ALL
4. FEELING TIRED OR HAVING LITTLE ENERGY: NOT AT ALL
SUM OF ALL RESPONSES TO PHQ QUESTIONS 1-9: 0

## 2024-04-23 NOTE — PROGRESS NOTES
Internal Medicine Outpatient  Follow Up      Chief complaint:  here for her eliquis  Subjective: pt had run out of eliquis and had not been taking it - she was taking irregularly until she ran out - so will need to start over again with full loading dose - R leg still very edematous    ROS:   A comprehensive review of systems was negative except for: some R leg swelling and pain       Objective:    /84 (Site: Left Upper Arm, Position: Sitting)   Pulse 99   Ht 1.575 m (5' 2\")   Wt 49.9 kg (110 lb)   LMP 04/16/2024   SpO2 100%   BMI 20.12 kg/m²     Gen: thin, NAD  HEENT: NC/AT, moist mucous membranes, no oropharyngeal erythema or exudate  Neck: supple, trachea midline, no anterior cervical or SC LAD  Heart:  Normal s1/s2, RRR, no murmurs, gallops, or rubs. R  leg edema  Lungs:  clear bilaterally, no wheeze, no rales, no rhonchi, no crackles, no use of accessory muscles  Abd: bowel sounds present, soft, nontender, nondistended, no masses  Extrem:  No clubbing, cyanosis,  R leg edema  Skin: no rashes or lesions  Psych: A & O x3  Neuro: grossly intact, moves all 4 extremities.      Assessment:         ICD-10-CM    1. Acute deep vein thrombosis (DVT) of femoral vein of right lower extremity (HCC)  I82.411       2. Current smoker  F17.200            Plan:   Recurrent DVT - continue with eliquis - pt does have her patient assistance = she will be restarted on the loading dose of eliquis and continue with lifelong anticoagulation - she is encouraged to wrap her leg as toleated  Tob abuse - pt encouraged to discontinue smoking      Code status:  Full code             Medications:  Patient's Medications   New Prescriptions    No medications on file   Previous Medications    APIXABAN (ELIQUIS) 5 MG TABS TABLET    Take 2 tablets by mouth 2 times daily for 7 days    APIXABAN (ELIQUIS) 5 MG TABS TABLET    Take 1 tablet by mouth 2 times daily    ASPIRIN 81 MG

## 2024-05-14 NOTE — TELEPHONE ENCOUNTER
Follow up call regarding [employer] patient assistance. She now has medical insurance through [employer]. Has 10 days of medication left.     [facility] [address]    Med pending

## 2024-05-29 SDOH — ECONOMIC STABILITY: HOUSING INSECURITY
IN THE LAST 12 MONTHS, WAS THERE A TIME WHEN YOU DID NOT HAVE A STEADY PLACE TO SLEEP OR SLEPT IN A SHELTER (INCLUDING NOW)?: NO

## 2024-05-29 SDOH — ECONOMIC STABILITY: FOOD INSECURITY: WITHIN THE PAST 12 MONTHS, THE FOOD YOU BOUGHT JUST DIDN'T LAST AND YOU DIDN'T HAVE MONEY TO GET MORE.: NEVER TRUE

## 2024-05-29 SDOH — ECONOMIC STABILITY: TRANSPORTATION INSECURITY
IN THE PAST 12 MONTHS, HAS LACK OF TRANSPORTATION KEPT YOU FROM MEETINGS, WORK, OR FROM GETTING THINGS NEEDED FOR DAILY LIVING?: NO

## 2024-05-29 SDOH — ECONOMIC STABILITY: FOOD INSECURITY: WITHIN THE PAST 12 MONTHS, YOU WORRIED THAT YOUR FOOD WOULD RUN OUT BEFORE YOU GOT MONEY TO BUY MORE.: NEVER TRUE

## 2024-05-29 SDOH — ECONOMIC STABILITY: INCOME INSECURITY: HOW HARD IS IT FOR YOU TO PAY FOR THE VERY BASICS LIKE FOOD, HOUSING, MEDICAL CARE, AND HEATING?: NOT HARD AT ALL

## 2024-05-30 ENCOUNTER — OFFICE VISIT (OUTPATIENT)
Dept: INTERNAL MEDICINE CLINIC | Age: 39
End: 2024-05-30

## 2024-05-30 VITALS
BODY MASS INDEX: 20.24 KG/M2 | OXYGEN SATURATION: 99 % | WEIGHT: 110 LBS | SYSTOLIC BLOOD PRESSURE: 104 MMHG | HEIGHT: 62 IN | HEART RATE: 79 BPM | DIASTOLIC BLOOD PRESSURE: 70 MMHG

## 2024-05-30 DIAGNOSIS — I82.411 ACUTE DEEP VEIN THROMBOSIS (DVT) OF FEMORAL VEIN OF RIGHT LOWER EXTREMITY (HCC): Primary | ICD-10-CM

## 2024-05-30 DIAGNOSIS — F17.200 CURRENT SMOKER: ICD-10-CM

## 2024-05-30 PROCEDURE — G8420 CALC BMI NORM PARAMETERS: HCPCS | Performed by: INTERNAL MEDICINE

## 2024-05-30 PROCEDURE — 99213 OFFICE O/P EST LOW 20 MIN: CPT | Performed by: INTERNAL MEDICINE

## 2024-05-30 PROCEDURE — G8428 CUR MEDS NOT DOCUMENT: HCPCS | Performed by: INTERNAL MEDICINE

## 2024-05-30 PROCEDURE — 4004F PT TOBACCO SCREEN RCVD TLK: CPT | Performed by: INTERNAL MEDICINE

## 2024-05-30 NOTE — PATIENT INSTRUCTIONS
Recurrent venous thrombosis.  Continue taking apixaban (Eliquis) 5 mg TWICE per day.   Smoking.  Consider cutting back on cigarettes every day.      Follow-up with [facility]

## 2024-05-30 NOTE — PROGRESS NOTES
SUBJECTIVE:  Follow up from 4/23 (Dr Cox) for DVT, had not been taking Eliquis.  She now has Medicaid and able to get the medicine.  Right lower extremity edema resolving.  Hospitalized (3/11 - 3/12) with DVT of femoral vein of right lower extremity requiring thrombectomy, discharged on Eliquis.  Previous history of hypercoagulable state with family history of Factor V Leiden mutation and DVT in 1999 during pregnancy for which she was treated with Lovenox injections.         MEDICATIONS:  Current Outpatient Medications   Medication Sig Dispense Refill    apixaban (ELIQUIS) 5 MG TABS tablet Take 1 tablet by mouth 2 times daily 60 tablet 5    aspirin 81 MG chewable tablet Take 1 tablet by mouth daily (Patient not taking: Reported on 5/30/2024) 30 tablet 3     No current facility-administered medications for this visit.         No results found for: \"LABA1C\"  Lab Results   Component Value Date    WBC 8.9 03/12/2024    HGB 11.2 (L) 03/12/2024     03/12/2024    MCV 89.8 03/12/2024     Lab Results   Component Value Date     (L) 03/12/2024    K 3.8 03/12/2024     03/12/2024    CO2 23 03/12/2024    BUN 10 03/12/2024    CREATININE <0.5 (L) 03/12/2024    GLUCOSE 106 (H) 03/12/2024       OBJECTIVE:    /70 (Site: Left Upper Arm, Position: Sitting, Cuff Size: Small Adult)   Pulse 79   Ht 1.575 m (5' 2\")   Wt 49.9 kg (110 lb)   LMP 05/20/2024   SpO2 99%   BMI 20.12 kg/m²   HEENT:  Oropharynx clear, no lymphadenopathy,   LUNGS:  Clear and without wheezes  HEART:  Regular rhythm, no appreciable murmur  ABD:  Benign, active bowel sounds  EXT:  No edema, pulses palpable  NEURO:  No focal neurologic deficits noted.    ASSESSMENT / PLAN:   Recurrent venous thrombosis.  Continue taking apixaban (Eliquis) 5 mg TWICE per day.   Smoking.  Consider cutting back on cigarettes every day.      Follow-up with [facility]

## 2024-09-12 ENCOUNTER — HOSPITAL ENCOUNTER (EMERGENCY)
Age: 39
Discharge: HOME | End: 2024-09-12
Attending: EMERGENCY MEDICINE
Payer: COMMERCIAL

## 2024-09-12 ENCOUNTER — ANCILLARY PROCEDURE (OUTPATIENT)
Dept: EMERGENCY DEPT | Age: 39
End: 2024-09-12
Attending: EMERGENCY MEDICINE
Payer: COMMERCIAL

## 2024-09-12 VITALS
DIASTOLIC BLOOD PRESSURE: 88 MMHG | WEIGHT: 117 LBS | OXYGEN SATURATION: 100 % | TEMPERATURE: 98.6 F | HEART RATE: 88 BPM | SYSTOLIC BLOOD PRESSURE: 152 MMHG | RESPIRATION RATE: 18 BRPM | HEIGHT: 61 IN | BODY MASS INDEX: 22.09 KG/M2

## 2024-09-12 DIAGNOSIS — Z86.718 HISTORY OF DEEP VEIN THROMBOSIS (DVT) OF LOWER EXTREMITY: ICD-10-CM

## 2024-09-12 DIAGNOSIS — M79.604 RIGHT LEG PAIN: Primary | ICD-10-CM

## 2024-09-12 PROCEDURE — 99284 EMERGENCY DEPT VISIT MOD MDM: CPT

## 2024-09-12 PROCEDURE — 93971 EXTREMITY STUDY: CPT

## 2024-09-12 RX ORDER — ACETAMINOPHEN 500 MG
1000 TABLET ORAL
Status: DISCONTINUED | OUTPATIENT
Start: 2024-09-12 | End: 2024-09-12 | Stop reason: HOSPADM

## 2024-09-12 ASSESSMENT — PAIN - FUNCTIONAL ASSESSMENT: PAIN_FUNCTIONAL_ASSESSMENT: 0-10

## 2024-09-12 ASSESSMENT — PAIN SCALES - GENERAL: PAINLEVEL_OUTOF10: 3

## 2024-09-13 ENCOUNTER — HOSPITAL ENCOUNTER (OUTPATIENT)
Dept: VASCULAR LAB | Age: 39
Discharge: HOME | End: 2024-09-15
Attending: EMERGENCY MEDICINE
Payer: COMMERCIAL

## 2024-09-13 ENCOUNTER — OFFICE VISIT (OUTPATIENT)
Dept: PRIMARY CARE CLINIC | Age: 39
End: 2024-09-13

## 2024-09-13 VITALS
OXYGEN SATURATION: 98 % | WEIGHT: 118.2 LBS | DIASTOLIC BLOOD PRESSURE: 76 MMHG | HEART RATE: 74 BPM | BODY MASS INDEX: 21.75 KG/M2 | TEMPERATURE: 98.2 F | SYSTOLIC BLOOD PRESSURE: 112 MMHG | HEIGHT: 62 IN

## 2024-09-13 DIAGNOSIS — I82.811 ACUTE SUPERFICIAL VENOUS THROMBOSIS OF RIGHT LOWER EXTREMITY: ICD-10-CM

## 2024-09-13 DIAGNOSIS — M79.604 RIGHT LEG PAIN: ICD-10-CM

## 2024-09-13 DIAGNOSIS — Z76.89 ENCOUNTER TO ESTABLISH CARE: Primary | ICD-10-CM

## 2024-09-13 DIAGNOSIS — Z86.718 HISTORY OF DVT OF LOWER EXTREMITY: ICD-10-CM

## 2024-09-13 DIAGNOSIS — D68.51 FACTOR V LEIDEN MUTATION (HCC): ICD-10-CM

## 2024-09-13 LAB — ECHO BSA: 1.53 M2

## 2024-09-13 PROCEDURE — 93971 EXTREMITY STUDY: CPT | Performed by: SURGERY

## 2024-09-13 PROCEDURE — 93971 EXTREMITY STUDY: CPT

## 2024-09-13 RX ORDER — LIDOCAINE 4 G/G
1 PATCH TOPICAL DAILY
Qty: 15 PATCH | Refills: 0 | Status: SHIPPED | OUTPATIENT
Start: 2024-09-13 | End: 2024-09-28

## 2024-09-13 SDOH — HEALTH STABILITY: PHYSICAL HEALTH: ON AVERAGE, HOW MANY MINUTES DO YOU ENGAGE IN EXERCISE AT THIS LEVEL?: 150+ MIN

## 2024-09-13 SDOH — HEALTH STABILITY: PHYSICAL HEALTH: ON AVERAGE, HOW MANY DAYS PER WEEK DO YOU ENGAGE IN MODERATE TO STRENUOUS EXERCISE (LIKE A BRISK WALK)?: 7 DAYS

## 2024-09-16 ENCOUNTER — TELEPHONE (OUTPATIENT)
Dept: VASCULAR SURGERY | Age: 39
End: 2024-09-16

## 2024-09-27 ENCOUNTER — OFFICE VISIT (OUTPATIENT)
Dept: PRIMARY CARE CLINIC | Age: 39
End: 2024-09-27

## 2024-09-27 VITALS
WEIGHT: 121.8 LBS | HEART RATE: 109 BPM | OXYGEN SATURATION: 99 % | RESPIRATION RATE: 18 BRPM | BODY MASS INDEX: 22.41 KG/M2 | HEIGHT: 62 IN | DIASTOLIC BLOOD PRESSURE: 68 MMHG | SYSTOLIC BLOOD PRESSURE: 118 MMHG

## 2024-09-27 DIAGNOSIS — L65.9 HAIR LOSS: ICD-10-CM

## 2024-09-27 DIAGNOSIS — F17.200 TOBACCO USE DISORDER: ICD-10-CM

## 2024-09-27 DIAGNOSIS — Z11.59 NEED FOR HEPATITIS C SCREENING TEST: ICD-10-CM

## 2024-09-27 DIAGNOSIS — D68.51 FACTOR V LEIDEN MUTATION (HCC): ICD-10-CM

## 2024-09-27 DIAGNOSIS — Z86.718 HISTORY OF DVT OF LOWER EXTREMITY: Primary | ICD-10-CM

## 2024-09-27 DIAGNOSIS — Z00.00 HEALTHCARE MAINTENANCE: ICD-10-CM

## 2024-09-27 DIAGNOSIS — Z12.31 ENCOUNTER FOR SCREENING MAMMOGRAM FOR BREAST CANCER: ICD-10-CM

## 2024-09-27 DIAGNOSIS — L60.3 BRITTLE NAILS: ICD-10-CM

## 2024-09-27 RX ORDER — VARENICLINE TARTRATE 1 MG/1
1 TABLET, FILM COATED ORAL 2 TIMES DAILY
Qty: 180 TABLET | Refills: 1 | Status: SHIPPED | OUTPATIENT
Start: 2024-09-27

## 2024-10-22 NOTE — TELEPHONE ENCOUNTER
Refill Request       Last Seen: Last Seen Department: 9/27/2024  Last Seen by PCP: 9/27/2024    Last Written:   9/12/24 60 1 refill   Next Appointment:   Future Appointments   Date Time Provider Department Center   10/29/2024  9:45 AM BASSAM MONCADA [facility]   12/13/2024 10:00 AM Daniel Martinez,  [facility]             Requested Prescriptions     Pending Prescriptions Disp Refills    apixaban (ELIQUIS) 5 MG TABS tablet 60 tablet 1     Sig: Take 1 tablet by mouth 2 times daily

## 2024-10-29 ENCOUNTER — HOSPITAL ENCOUNTER (OUTPATIENT)
Dept: WOMENS IMAGING | Age: 39
Discharge: HOME | End: 2024-10-29
Payer: COMMERCIAL

## 2024-10-29 ENCOUNTER — ANCILLARY ORDERS (OUTPATIENT)
Dept: PRIMARY CARE CLINIC | Age: 39
End: 2024-10-29

## 2024-10-29 VITALS — HEIGHT: 62 IN | WEIGHT: 118 LBS | BODY MASS INDEX: 21.71 KG/M2

## 2024-10-29 DIAGNOSIS — L65.9 HAIR LOSS: ICD-10-CM

## 2024-10-29 DIAGNOSIS — Z86.718 HISTORY OF DVT OF LOWER EXTREMITY: Primary | ICD-10-CM

## 2024-10-29 DIAGNOSIS — Z00.00 HEALTHCARE MAINTENANCE: ICD-10-CM

## 2024-10-29 DIAGNOSIS — Z12.31 ENCOUNTER FOR SCREENING MAMMOGRAM FOR BREAST CANCER: ICD-10-CM

## 2024-10-29 DIAGNOSIS — F17.200 TOBACCO USE DISORDER: ICD-10-CM

## 2024-10-29 DIAGNOSIS — L60.3 BRITTLE NAILS: ICD-10-CM

## 2024-10-29 DIAGNOSIS — R92.8 ABNORMALITY OF BOTH BREASTS ON SCREENING MAMMOGRAM: ICD-10-CM

## 2024-10-29 DIAGNOSIS — D68.51 FACTOR V LEIDEN MUTATION (HCC): ICD-10-CM

## 2024-10-29 DIAGNOSIS — Z11.59 NEED FOR HEPATITIS C SCREENING TEST: ICD-10-CM

## 2024-10-29 PROCEDURE — 77063 BREAST TOMOSYNTHESIS BI: CPT

## 2024-10-31 NOTE — RESULT ENCOUNTER NOTE
Called patient and informed her of her results.  Patient told me she was already [facility] and they schedule her for ultrasound and mammogram.  I could not see the orders listed in epic currently so I am going to place the orders again, but I can see an appointment listed for November 7 for ultrasounds of mammogram.  I answered all of patient's questions and concerns.

## 2024-11-07 ENCOUNTER — HOSPITAL ENCOUNTER (OUTPATIENT)
Dept: WOMENS IMAGING | Age: 39
Discharge: HOME | End: 2024-11-07
Payer: COMMERCIAL

## 2024-11-07 DIAGNOSIS — R92.8 ABNORMALITY OF BOTH BREASTS ON SCREENING MAMMOGRAM: ICD-10-CM

## 2024-11-07 PROCEDURE — 76642 ULTRASOUND BREAST LIMITED: CPT

## 2024-11-07 PROCEDURE — G0279 TOMOSYNTHESIS, MAMMO: HCPCS

## 2024-12-13 ENCOUNTER — OFFICE VISIT (OUTPATIENT)
Dept: PRIMARY CARE CLINIC | Age: 39
End: 2024-12-13

## 2024-12-13 VITALS
BODY MASS INDEX: 23.63 KG/M2 | DIASTOLIC BLOOD PRESSURE: 60 MMHG | OXYGEN SATURATION: 100 % | SYSTOLIC BLOOD PRESSURE: 108 MMHG | HEIGHT: 62 IN | WEIGHT: 128.4 LBS | HEART RATE: 101 BPM

## 2024-12-13 DIAGNOSIS — R92.30 DENSE BREAST TISSUE ON MAMMOGRAM, UNSPECIFIED TYPE: ICD-10-CM

## 2024-12-13 DIAGNOSIS — R92.8 ABNORMAL MAMMOGRAM OF BOTH BREASTS: ICD-10-CM

## 2024-12-13 DIAGNOSIS — Z01.419 WELL WOMAN EXAM WITH ROUTINE GYNECOLOGICAL EXAM: Primary | ICD-10-CM

## 2024-12-16 LAB
HPV HR 12 DNA SPEC QL NAA+PROBE: NOT DETECTED
HPV16 DNA SPEC QL NAA+PROBE: NOT DETECTED
HPV16+18+H RISK 12 DNA SPEC-IMP: NORMAL
HPV18 DNA SPEC QL NAA+PROBE: NOT DETECTED

## 2024-12-23 ENCOUNTER — TELEPHONE (OUTPATIENT)
Dept: PRIMARY CARE CLINIC | Age: 39
End: 2024-12-23

## 2024-12-23 DIAGNOSIS — R92.30 DENSE BREAST TISSUE ON MAMMOGRAM, UNSPECIFIED TYPE: Primary | ICD-10-CM

## 2024-12-23 NOTE — TELEPHONE ENCOUNTER
[facility] called  has an order of MRI of the breast with contrast   They need the order to be with and with out contrast   Please advise

## 2025-01-16 ENCOUNTER — HOSPITAL ENCOUNTER (OUTPATIENT)
Dept: MRI IMAGING | Age: 40
Discharge: HOME | End: 2025-01-16
Payer: COMMERCIAL

## 2025-01-16 DIAGNOSIS — R92.30 DENSE BREAST TISSUE ON MAMMOGRAM, UNSPECIFIED TYPE: ICD-10-CM

## 2025-01-16 PROCEDURE — A9579 GAD-BASE MR CONTRAST NOS,1ML: HCPCS

## 2025-01-16 PROCEDURE — 6360000004 HC RX CONTRAST MEDICATION

## 2025-01-16 PROCEDURE — C8908 MRI W/O FOL W/CONT, BREAST,: HCPCS

## 2025-01-16 RX ADMIN — GADOTERIDOL 12 ML: 279.3 INJECTION, SOLUTION INTRAVENOUS at 12:05

## 2025-01-17 DIAGNOSIS — R92.8 ABNORMAL MAMMOGRAM: Primary | ICD-10-CM
